# Patient Record
Sex: FEMALE | Race: WHITE | NOT HISPANIC OR LATINO | Employment: FULL TIME | URBAN - METROPOLITAN AREA
[De-identification: names, ages, dates, MRNs, and addresses within clinical notes are randomized per-mention and may not be internally consistent; named-entity substitution may affect disease eponyms.]

---

## 2023-03-25 ENCOUNTER — OFFICE VISIT (OUTPATIENT)
Dept: URGENT CARE | Facility: CLINIC | Age: 33
End: 2023-03-25

## 2023-03-25 VITALS
TEMPERATURE: 99.6 F | HEART RATE: 96 BPM | HEIGHT: 65 IN | RESPIRATION RATE: 20 BRPM | OXYGEN SATURATION: 100 % | DIASTOLIC BLOOD PRESSURE: 80 MMHG | SYSTOLIC BLOOD PRESSURE: 114 MMHG | BODY MASS INDEX: 21.8 KG/M2

## 2023-03-25 DIAGNOSIS — J06.9 UPPER RESPIRATORY TRACT INFECTION, UNSPECIFIED TYPE: Primary | ICD-10-CM

## 2023-03-25 RX ORDER — ALBUTEROL SULFATE 90 UG/1
2 AEROSOL, METERED RESPIRATORY (INHALATION) EVERY 4 HOURS PRN
Qty: 6.7 G | Refills: 0 | Status: SHIPPED | OUTPATIENT
Start: 2023-03-25

## 2023-03-25 RX ORDER — PREDNISONE 20 MG/1
20 TABLET ORAL DAILY
Qty: 4 TABLET | Refills: 0 | Status: SHIPPED | OUTPATIENT
Start: 2023-03-25 | End: 2023-03-29

## 2023-03-25 NOTE — PROGRESS NOTES
[facility] [facility]        NAME: Ros Espinosa is a 33 y o  female  : 1990    MRN: [id number]  DATE: 2023  TIME: 8:57 AM    Assessment and Plan   Upper respiratory tract infection, unspecified type [J06 9]  1  Upper respiratory tract infection, unspecified type  predniSONE 20 mg tablet    albuterol (Proventil HFA) 90 mcg/act inhaler        Pt clinically appears well  Discussed strict return to care precautions as well as red flag symptoms which should prompt immediate ED referral  Pt verbalized understanding and is in agreement with plan  Please follow up with your primary care provider within the next week  Please remember that your visit today was with an urgent care provider and should not replace follow up with your primary care provider for chronic medical issues or annual physicals  Patient Instructions       Follow up with PCP in 3-5 days  Proceed to  ER if symptoms worsen  Chief Complaint     Chief Complaint   Patient presents with   • Cold Like Symptoms     Pt here ill x 5 days  s/s  sore throat, cough,  ear  pressure, congestion  No fever  Pt used OTC meds not helping  Pt is vaxed,  no Flu shot  No home  Covid  test  done  History of Present Illness       Ros Espinosa is a(n) 33 y o  female presenting with URI symptoms x 5 days  Past medical history: HOWIE  Congestion: yes  Sore throat: yes, worse in morning  Cough: yes  Sputum production: yes, green/brown  States she saw a few specks of blood in sputum  Fever: no  Body aches: yes  Loss of smell/taste: no  GI symptoms: no   Known sick contacts: some coworkers  OTC meds tried: Tessalon pearls, cough drops, Theraflu   Vaccinated against COVID19: yes    No facial pressure, HA, CP, SOB  +rib pain from coughing  Used a nebulizer at work yesterday which provided some relief but she does not have one at home  Has been waking up in the middle of the night from coughing        Review of Systems   Review of Systems Constitutional: Negative for fever  Negative except as noted in HPI   Respiratory: Negative for chest tightness, shortness of breath and wheezing  Cardiovascular: Negative for chest pain  Current Medications       Current Outpatient Medications:   •  albuterol (Proventil HFA) 90 mcg/act inhaler, Inhale 2 puffs every 4 (four) hours as needed for wheezing or shortness of breath (persistent cough), Disp: 6 7 g, Rfl: 0  •  ALPRAZolam (XANAX) 0 5 mg tablet, Take 1 tablet (0 5 mg total) by mouth 2 (two) times a day as needed for anxiety, Disp: 30 tablet, Rfl: 0  •  amphetamine-dextroamphetamine (ADDERALL) 10 mg tablet, Take 1 tablet (10 mg total) by mouth daily Max Daily Amount: 10 mg, Disp: 30 tablet, Rfl: 0  •  benzonatate (TESSALON) 200 MG capsule, Take 1 capsule (200 mg total) by mouth 3 (three) times a day as needed for cough, Disp: 20 capsule, Rfl: 0  •  predniSONE 20 mg tablet, Take 1 tablet (20 mg total) by mouth daily for 4 days, Disp: 4 tablet, Rfl: 0  •  VENTOLIN  (90 Base) MCG/ACT inhaler, , Disp: , Rfl:     Current Allergies     Allergies as of 03/25/2023   • (No Known Allergies)            The following portions of the patient's history were reviewed and updated as appropriate: allergies, current medications, past family history, past medical history, past social history, past surgical history and problem list      Past Medical History:   Diagnosis Date   • Anxiety        Past Surgical History:   Procedure Laterality Date   • TONSILLECTOMY         Family History   Problem Relation Age of Onset   • Hypertension Mother    • Heart disease Father    • Diabetes Father    • Atrial fibrillation Father    • Sleep apnea Father    • Substance Abuse Neg Hx    • Mental illness Neg Hx          Medications have been verified          Objective   /80   Pulse 96   Temp 99 6 °F (37 6 °C) (Tympanic)   Resp 20   Ht 5' 5" (1 651 m)   SpO2 100%   BMI 21 80 kg/m²        Physical Exam Physical Exam  Vitals and nursing note reviewed  Constitutional:       General: She is not in acute distress  Appearance: Normal appearance  She is not toxic-appearing  HENT:      Head: Normocephalic and atraumatic  Right Ear: Tympanic membrane, ear canal and external ear normal       Left Ear: Tympanic membrane, ear canal and external ear normal       Nose: No congestion  Mouth/Throat:      Mouth: Mucous membranes are moist       Pharynx: Oropharynx is clear  No oropharyngeal exudate or posterior oropharyngeal erythema  Eyes:      Conjunctiva/sclera: Conjunctivae normal       Pupils: Pupils are equal, round, and reactive to light  Cardiovascular:      Rate and Rhythm: Normal rate and regular rhythm  Heart sounds: Normal heart sounds  Pulmonary:      Effort: Pulmonary effort is normal  No respiratory distress  Breath sounds: Normal breath sounds  No wheezing, rhonchi or rales  Abdominal:      General: Abdomen is flat  Palpations: Abdomen is soft  Musculoskeletal:      Cervical back: Normal range of motion and neck supple  Skin:     General: Skin is warm and dry  Capillary Refill: Capillary refill takes less than 2 seconds  Neurological:      Mental Status: She is alert and oriented to person, place, and time     Psychiatric:         Behavior: Behavior normal

## 2023-06-08 ENCOUNTER — OFFICE VISIT (OUTPATIENT)
Dept: FAMILY MEDICINE CLINIC | Facility: CLINIC | Age: 33
End: 2023-06-08
Payer: COMMERCIAL

## 2023-06-08 ENCOUNTER — TELEPHONE (OUTPATIENT)
Dept: FAMILY MEDICINE CLINIC | Facility: CLINIC | Age: 33
End: 2023-06-08

## 2023-06-08 ENCOUNTER — TELEPHONE (OUTPATIENT)
Dept: OTHER | Facility: OTHER | Age: 33
End: 2023-06-08

## 2023-06-08 VITALS
RESPIRATION RATE: 18 BRPM | HEIGHT: 65 IN | TEMPERATURE: 99.7 F | SYSTOLIC BLOOD PRESSURE: 122 MMHG | OXYGEN SATURATION: 99 % | WEIGHT: 131 LBS | HEART RATE: 74 BPM | BODY MASS INDEX: 21.83 KG/M2 | DIASTOLIC BLOOD PRESSURE: 74 MMHG

## 2023-06-08 DIAGNOSIS — S39.012A STRAIN OF LUMBAR REGION, INITIAL ENCOUNTER: Primary | ICD-10-CM

## 2023-06-08 PROCEDURE — 99213 OFFICE O/P EST LOW 20 MIN: CPT | Performed by: FAMILY MEDICINE

## 2023-06-08 NOTE — TELEPHONE ENCOUNTER
Patient is requesting an appointment to be seen as soon as possible and needs a note for work due to injuring her back    Please call

## 2023-06-08 NOTE — LETTER
June 8, 2023     Patient: Ramírez Palacios  YOB: 1990  Date of Visit: 6/8/2023      To Whom it May Concern:    Shaila Rivera is under my professional care  Anisa Bruno was seen in my office on 6/8/2023  Anisa Bruno may return to work on 6/10/23   If you have any questions or concerns, please don't hesitate to call           Sincerely,          Zain Contreras MD        CC: No Recipients

## 2023-06-08 NOTE — PATIENT INSTRUCTIONS
Continue ibuprofen 600 mg 3 times daily  I would ice the area 3-4 times daily for the next 3 to 4 days  Then switch to heat  We will give work note for 1 day  Gentle stretching  If this does not resolve through the weekend, consider physical therapy

## 2023-06-08 NOTE — PROGRESS NOTES
[address]         NAME: Dharmesh Pascal is a 29 y o  female  : 1990    MRN: [id number]  DATE: 2023  TIME: 4:41 PM    Assessment and Plan   Strain of lumbar region, initial encounter [S39 012A]  1  Strain of lumbar region, initial encounter            No problem-specific Assessment & Plan notes found for this encounter  Patient Instructions     Patient Instructions   Continue ibuprofen 600 mg 3 times daily  I would ice the area 3-4 times daily for the next 3 to 4 days  Then switch to heat  We will give work note for 1 day  Gentle stretching  If this does not resolve through the weekend, consider physical therapy  Chief Complaint     Chief Complaint   Patient presents with   • Back Pain     Pulled something in her back after twisting from getting attacked from another dog  History of Present Illness       Comes in today for evaluation of back pain  Had an injury yesterday while lifting her dog away from another animal   Twisted and has had some pain on the lumbar region  Remote history of previous back injury 6 to 7 years ago  Somewhat better today after using ice and ibuprofen  Review of Systems   Review of Systems   Constitutional: Positive for activity change  Negative for fatigue and fever  Musculoskeletal: Positive for back pain, myalgias and neck pain  Negative for neck stiffness  Skin: Negative for rash and wound  Neurological: Negative for weakness           Current Medications       Current Outpatient Medications:   •  albuterol (Proventil HFA) 90 mcg/act inhaler, Inhale 2 puffs every 4 (four) hours as needed for wheezing or shortness of breath (persistent cough), Disp: 6 7 g, Rfl: 0  •  ALPRAZolam (XANAX) 0 5 mg tablet, Take 1 tablet (0 5 mg total) by mouth 2 (two) times a day as needed for anxiety, Disp: 30 tablet, Rfl: 0  •  amphetamine-dextroamphetamine (ADDERALL) 10 mg tablet, Take 1 tablet (10 mg total) by mouth Pt reports elevated BP x couple days. Pt states she took an old prescription of micardis yesterday to see if it would help, some relief. Also taking clonidine 0.2mg today, PTA (normal dose is clonidine 0.1mg)per cardiologist.  Reports speaking with on-call Cardiologist and advised to go to the ED if BP did not come down. "daily Max Daily Amount: 10 mg, Disp: 30 tablet, Rfl: 0  •  VENTOLIN  (90 Base) MCG/ACT inhaler, , Disp: , Rfl:   •  benzonatate (TESSALON) 200 MG capsule, Take 1 capsule (200 mg total) by mouth 3 (three) times a day as needed for cough (Patient not taking: Reported on 6/8/2023), Disp: 20 capsule, Rfl: 0    Current Allergies     Allergies as of 06/08/2023   • (No Known Allergies)            The following portions of the patient's history were reviewed and updated as appropriate: allergies, current medications, past family history, past medical history, past social history, past surgical history and problem list      Past Medical History:   Diagnosis Date   • Anxiety        Past Surgical History:   Procedure Laterality Date   • TONSILLECTOMY         Family History   Problem Relation Age of Onset   • Hypertension Mother    • Heart disease Father    • Diabetes Father    • Atrial fibrillation Father    • Sleep apnea Father    • Substance Abuse Neg Hx    • Mental illness Neg Hx          Medications have been verified  Objective   /74 (BP Location: Right arm, Patient Position: Sitting, Cuff Size: Adult)   Pulse 74   Temp 99 7 °F (37 6 °C) (Tympanic)   Resp 18   Ht 5' 5\" (1 651 m)   Wt 59 4 kg (131 lb)   LMP 05/23/2023   SpO2 99%   BMI 21 80 kg/m²        Physical Exam     Physical Exam  Constitutional:       Appearance: Normal appearance  She is not ill-appearing  HENT:      Head: Normocephalic  Nose: Nose normal    Cardiovascular:      Rate and Rhythm: Normal rate and regular rhythm  Heart sounds: Normal heart sounds  Pulmonary:      Effort: Pulmonary effort is normal       Breath sounds: Normal breath sounds  Musculoskeletal:      Lumbar back: Spasms and tenderness present  Decreased range of motion  Negative right straight leg raise test and negative left straight leg raise test       Right hip: No bony tenderness or crepitus  Normal range of motion        Left hip: No bony " tenderness or crepitus  Normal range of motion  Skin:     Findings: No bruising or erythema  Neurological:      Mental Status: She is alert

## 2023-06-14 ENCOUNTER — TELEPHONE (OUTPATIENT)
Dept: PSYCHIATRY | Facility: CLINIC | Age: 33
End: 2023-06-14

## 2023-06-14 NOTE — TELEPHONE ENCOUNTER
"Behavioral Health Outpatient Intake Questions    Referred By   : self    Please advise interviewee that they need to answer all questions truthfully to allow for best care, and any misrepresentations of information may affect their ability to be seen at this clinic   => Was this discussed? Yes     If Minor Child (under age 25)    Who is/are the legal guardian(s) of the child? Is there a custody agreement? No     • If \"YES\"- Custody orders must be obtained prior to scheduling the first appointment  • In addition, Consent to Treatment must be signed by all legal guardians prior to scheduling the first appointment    • If \"NO\"- Consent to Treatment must be signed by all legal guardians prior to scheduling the first appointment    Behavioral Health Outpatient Intake History -     Presenting Problem (in patient's own words):  Child of alcoholics, anxiety, unrealistic expectations of self and others, newly     Are there any communication barriers for this patient? Yes                                               If yes, please describe barriers: ADD, audio processing delay  • If there is a unique situation, please refer to [facility] for final determination  Are you taking any psychiatric medications? No   •   If \"YES\" -What are they none   •   If \"YES\" -Who prescribes? Has the Patient previously received outpatient Talk Therapy or Medication Management from [place] [id number]  No     •    If \"YES\"- When, Where and with Whom? •    If \"NO\" -Has Patient received these services elsewhere? •   If \"YES\" -When, Where, and with Whom? [facility] spouse services    Has the Patient abused alcohol or other substances in the last 6 months ? No  No concerns of substance abuse are reported  •  If \"YES\" -What substance, How much, How often? •  If illegal substance: Refer to [employer] (for JOSE) or [employer]    •  If Alcohol in excess of 10 drinks per week:  " "Refer to [facility] (for JOSE) or [facility]    Legal History-     Is this treatment court ordered? No   If \"yes \"send to :  • Talk Therapy : [facility] to [facility] for final determination   • Med Management: Send to Dr Raudel Hernandez for final determination     Has the Patient been convicted of a felony? No   If \"Yes\" send to -When, What? • Talk Therapy : Send to [facility] for final determination   • Med Management: Send to Dr Raudel Hernandez for final determination     ACCEPTED as a patient Yes  • If \"Yes\" Appointment Date: 7/26/2023 @ 1:00 PM with Ellie Bautista    Referred Elsewhere? No  • If “Yes” - (Where? Ex: Xochitl Mccullough, [facility], [address] [place] [address], etc )       Name of Insurance Co: [address] [place] [address] ID# [id number]  [id number] Phone #   If ins is primary or secondary? Primary  If patient is a minor, parents information such as Name, D  O B of guarantor    "

## 2023-06-23 DIAGNOSIS — F41.9 ANXIETY: ICD-10-CM

## 2023-06-26 RX ORDER — ALPRAZOLAM 0.5 MG/1
0.5 TABLET ORAL 2 TIMES DAILY PRN
Qty: 30 TABLET | Refills: 0 | Status: SHIPPED | OUTPATIENT
Start: 2023-06-26

## 2023-07-26 ENCOUNTER — TELEMEDICINE (OUTPATIENT)
Dept: FAMILY MEDICINE CLINIC | Facility: CLINIC | Age: 33
End: 2023-07-26
Payer: COMMERCIAL

## 2023-07-26 DIAGNOSIS — H00.015 HORDEOLUM EXTERNUM OF LEFT LOWER EYELID: Primary | ICD-10-CM

## 2023-07-26 DIAGNOSIS — H10.32 ACUTE BACTERIAL CONJUNCTIVITIS OF LEFT EYE: ICD-10-CM

## 2023-07-26 DIAGNOSIS — Z13.6 SCREENING FOR CARDIOVASCULAR CONDITION: ICD-10-CM

## 2023-07-26 PROCEDURE — 99213 OFFICE O/P EST LOW 20 MIN: CPT | Performed by: FAMILY MEDICINE

## 2023-07-26 RX ORDER — ERYTHROMYCIN 5 MG/G
0.5 OINTMENT OPHTHALMIC EVERY 6 HOURS SCHEDULED
Qty: 3.5 G | Refills: 0 | Status: SHIPPED | OUTPATIENT
Start: 2023-07-26

## 2023-07-26 NOTE — PATIENT INSTRUCTIONS
Use warm compresses to the eye 3-4 times daily. Rhythm Meissen eye ointment 3-4 times daily. If this does not clear up within the next 5 to 7 days, seek ophthalmologic consultation. New order placed for routine labs to include CMP and lipid panel.

## 2023-07-26 NOTE — PROGRESS NOTES
Virtual Regular Visit    Verification of patient location:    Patient is located at Home in the following state in which I hold an active license [place]      Assessment/Plan:    Problem List Items Addressed This Visit    None  Visit Diagnoses     Hordeolum externum of left lower eyelid    -  Primary    Relevant Medications    erythromycin (ILOTYCIN) ophthalmic ointment    Other Relevant Orders    Ambulatory Referral to Ophthalmology    Acute bacterial conjunctivitis of left eye        Relevant Medications    erythromycin (ILOTYCIN) ophthalmic ointment    Other Relevant Orders    Ambulatory Referral to Ophthalmology    Screening for cardiovascular condition        Relevant Orders    Comprehensive metabolic panel    Lipid Panel with Direct LDL reflex               Reason for visit is   Chief Complaint   Patient presents with   • Conjunctivitis     runny   • Virtual Regular Visit        Encounter provider Donte Pineda MD    Provider located at [address] [place] [address] [address] [facility]      Recent Visits  No visits were found meeting these conditions. Showing recent visits within past 7 days and meeting all other requirements  Today's Visits  Date Type Provider Dept   07/26/23 Telemedicine Donte Pineda MD [facility] today's visits and meeting all other requirements  Future Appointments  No visits were found meeting these conditions. Showing future appointments within next 150 days and meeting all other requirements       The patient was identified by name and date of birth. Lilly York was informed that this is a telemedicine visit and that the visit is being conducted through the [employer]. She agrees to proceed. .  My office door was closed. No one else was in the room. She acknowledged consent and understanding of privacy and security of the video platform.  The patient has agreed to participate and understands they can discontinue the visit at any time. Patient is aware this is a billable service. Subjective  Sreedhar Gomez is a 29 y.o. female 8 . Patient calls in today had a spot on her left lower eyelid over the last several weeks. Has been hoping would just go away. She had manipulated the area yesterday and now has more tasting shot and some discharge from the left eye. Past Medical History:   Diagnosis Date   • Anxiety        Past Surgical History:   Procedure Laterality Date   • TONSILLECTOMY         Current Outpatient Medications   Medication Sig Dispense Refill   • albuterol (Proventil HFA) 90 mcg/act inhaler Inhale 2 puffs every 4 (four) hours as needed for wheezing or shortness of breath (persistent cough) 6.7 g 0   • ALPRAZolam (XANAX) 0.5 mg tablet Take 1 tablet (0.5 mg total) by mouth 2 (two) times a day as needed for anxiety 30 tablet 0   • amphetamine-dextroamphetamine (ADDERALL) 10 mg tablet Take 1 tablet (10 mg total) by mouth daily Max Daily Amount: 10 mg 30 tablet 0   • erythromycin (ILOTYCIN) ophthalmic ointment Administer 0.5 inches into the left eye every 6 (six) hours 3.5 g 0   • VENTOLIN  (90 Base) MCG/ACT inhaler      • benzonatate (TESSALON) 200 MG capsule Take 1 capsule (200 mg total) by mouth 3 (three) times a day as needed for cough (Patient not taking: Reported on 6/8/2023) 20 capsule 0     No current facility-administered medications for this visit. No Known Allergies     Review of Systems   Eyes: Positive for discharge and redness. Negative for pain and visual disturbance. Video Exam    There were no vitals filed for this visit. Physical Exam  Eyes:      General:         Left eye: Hordeolum present. Comments: Left lower eyelid lateral aspect. Blocked gland versus stye. Mild injection of the conjunctiva.           Visit Time  Total Visit Duration: 10

## 2023-08-10 ENCOUNTER — SOCIAL WORK (OUTPATIENT)
Dept: BEHAVIORAL/MENTAL HEALTH CLINIC | Facility: CLINIC | Age: 33
End: 2023-08-10
Payer: COMMERCIAL

## 2023-08-10 DIAGNOSIS — F43.21 ADJUSTMENT DISORDER WITH DEPRESSED MOOD: ICD-10-CM

## 2023-08-10 DIAGNOSIS — F41.1 GENERALIZED ANXIETY DISORDER: Primary | ICD-10-CM

## 2023-08-10 PROCEDURE — 90837 PSYTX W PT 60 MINUTES: CPT | Performed by: SOCIAL WORKER

## 2023-08-10 NOTE — PSYCH
Behavioral Health Psychotherapy Assessment    Date of Initial Psychotherapy Assessment: 08/10/23  Referral Source: use to work for PF  Has a release of information been signed for the referral source? Yes    Preferred Name: Lili Black  Preferred Pronouns: She/her  YOB: 1990 Age: 29 y.o. Sex assigned at birth: female   Gender Identity: female  Race:   Preferred Language: English    Emergency Contact:  Full Name: Mariela Mccartney  Relationship to Client:    Contact information: [phone]    Primary Care Physician:  Sindy Lewis MD  [address] [place] [address] 2  [place] [phone]  Has a release of information been signed? Yes    Physical Health History:  Past surgical procedures: wisdom teeth  Do you have a history of any of the following: other N/A  Do you have any mobility issues? No    Relevant Family History:  Pt dad develop diabetes since COVID. Pt dad has afib, sleep apnea,  Alcoholism runs in family. Presenting Problem (What brings you in?)  Anxiety, needing CBT to restructure negative thinking, surviving being a child of alcoholism, enabling on  side of family with alcoholism, need balance, black and white thinking. Mental Health Advance Directive:  Do you currently have a Mental Health Advance Directive? no    Diagnosis:   Diagnosis ICD-10-CM Associated Orders   1. Generalized anxiety disorder  F41.1       2.  Adjustment disorder with depressed mood  F43.21           Initial Assessment:     Current Mental Status:    Appearance: appropriate and neat      Behavior/Manner: cooperative      Speech:  Normal and articulate    Sleep:  Normal    Oriented to: oriented to self, oriented to place and oriented to time       Clinical Symptoms    Depression: yes      Anxiety: yes      Depression Symptoms: depressed mood, restlessness, fatigue, indecision, poor concentration and irritable      Anxiety Symptoms: excessive worry, irritable, feeling of choking, nervous/anxious, difficulty controlling worry, restlessness, chest tightness, shortness of breath and dizziness      Have you ever been assaultive to others or the environment: No      Have you ever been self-injurious: No      Counseling History:  Previous Counseling or Treatment  (Mental Health or Drug & Alcohol): Yes    [facility]. Have you previously taken psychiatric medications: No      Suicide Risk Assessment  Have you ever had a suicide attempt: No    Have you had incidents of suicidal ideation: No    Are you currently experiencing suicidal thoughts: No      Substance Abuse/Addiction Assessment:  Alcohol: No    Heroin: No    Fentanyl: No    Opiates: No    Cocaine: No    Amphetamines: No    Hallucinogens: No    Club Drugs: No    Benzodiazepines: No    Other Rx Meds: No    Marijuana: No    Tobacco/Nicotine: No    Have you experienced blackouts as a result of substance use: No    Have you had any periods of abstinence: No    Have you experienced symptoms of withdrawal: No    Have you ever overdosed on any substances?: No    Are you currently using any Medication Assisted Treatment for Substance Use: No      Disordered Eating History:  Do you have a history of disordered eating: No      Social Determinants of Health:    SDOH:  Stress and social isolation    Trauma and Abuse History:    Have you ever been abused: No      Legal History:    Have you ever been arrested or had a DUI: Yes      Have you been incarcerated: Yes      Are you currently on parole/probation: No      Any current Children and Youth involvement: No      Any pending legal charges: No      Additional Legal History:  Early 20's had a DUI, don't have a record, got dropped. Never drank again. Relationship History:    Current marital status:       Natural Supports:   Mother, siblings and friends    Relationship History:  Pt reports that her mother is supportive, her dad is alcoholic and homeless, she doesn't hear from him as much. Pt reports that she has a half-sister and few friends that are supportive.     Employment History    Are you currently employed: Yes      Longest period of employment:  1.5 years ago    Employer/ Job title:  [employer]    Future work goals:  Applied to [employer] starting next January    Sources of income/financial support:  Work     History:      Status: no history of  [place] duty  Educational History:     Have you ever been diagnosed with a learning disability: Yes      Learning disability:  Audio processing delay    Highest level of education:  [address] attended/attendin[address] [place], [place] [address]d Through Hank Mel[employer]dez    Have you ever had an IEP or 504-plan: Yes      IEP/504 plan:  IEP did really well in school    Do you need assistance with reading or writing: No      Recommended Treatment:     Frequency:  2 times    Session frequency:  Monthly      Visit start and stop times:    08/10/23  Start Time: 900  Stop Time: 956  Total Visit Time: 56 minutes

## 2023-08-10 NOTE — BH CRISIS PLAN
Client Name: Shanna Ward       Client YOB: 1990  : 1990    Treatment Team (include name and contact information):     Psychotherapist: PERYR    Psychiatrist:  PERRY   Release of information completed: no     NA     Release of information completed: no    Other (Specify Role): N/A    Release of information completed: no    Other (Specify Role): N/A   Release of information completed: no    Healthcare Provider  Sharita Greenfield MD  [address] [facility] [address]  [place] [address]  [phone]    Type of Plan   * Child plans (children 15 yo and younger) must be completed and signed by the child's legal guardian   * Plans for all individuals 15 yo and above must be signed by the client. Plan Type: adolescent/adult (14 and over) Initial      My Personal Strengths are (in the client's own words):  Organized, dedicated, disciplined with finances. The stressors and triggers that may put me at risk are:  family conflict, alcoholism in the family, enabling. Coping skills I can use to keep myself calm and safe:  [place]/meditate, got a sponsor    Coping skills/supports I can use to maintain abstinence from substance use:   JIM    The people that provide me with help and support: (Include name, contact, and how they can help)   Support person #1:  Esteban Carter,     * Phone number: [phone]    * How can they help me? He is calm and will listen, ask her what she needs. Support person #2: Dm Morel    * Phone number: NA    * How can they help me? Talk to her everyday, help her come up with goals. Support person #3:  Nevelyn Landau, best friend    * Phone number: [phone]    * How can they help me? She will be there when I need her.     In the past, the following has helped me in times of crisis:    Being in a quiet space, Calling my sponsor and Taking a walk or exercising      If it is an emergency and you need immediate help, call     If there is a possibility of danger to yourself or others, call the following crisis hotline resources:     Adult Crisis Numbers  Suicide Prevention Hotline - Dial   [facility]: [address]: [address]: [address]: [phone]  [facility]: [phone]  [facility]: [address]: [address]: [phone] (daytime). [phone] (after hours, weekends, holidays)     Child/Adolescent Crisis Numbers   [facility]: [address]: [phone]   [facility]: [phone]   [facility]: [phone]    Please note: Some [facility] do not have a separate number for Child/Adolescent specific crisis. If your county is not listed under Child/Adolescent, please call the adult number for your county     National Talk to Text Line   All Ages - [phone]    In the event your feelings become unmanageable, and you cannot reach your support system, you will call 911 immediately or go to the nearest hospital emergency room.

## 2023-08-24 ENCOUNTER — SOCIAL WORK (OUTPATIENT)
Dept: BEHAVIORAL/MENTAL HEALTH CLINIC | Facility: CLINIC | Age: 33
End: 2023-08-24
Payer: COMMERCIAL

## 2023-08-24 DIAGNOSIS — F41.1 GENERALIZED ANXIETY DISORDER: Primary | ICD-10-CM

## 2023-08-24 PROCEDURE — 90837 PSYTX W PT 60 MINUTES: CPT | Performed by: SOCIAL WORKER

## 2023-08-24 NOTE — PSYCH
Behavioral Health Psychotherapy Progress Note    Psychotherapy Provided: Individual Psychotherapy     1. Generalized anxiety disorder            Goals addressed in session: Goal 1     DATA: Pt reports that she struggles with communication issues with her in laws, her mother in law, talks behind her back and her father in law stays busy to keep out of any conflict. Pt reports that she doesn't feel loved or appreciated because she is not pregnant or have any children with her spouse. Pt reports that her spouse feels stuck between her and his mother and it is hard for him to deal with conflict amongst his family members. During this session, this clinician used the following therapeutic modalities: Cognitive Behavioral Therapy    Substance Abuse was not addressed during this session. If the client is diagnosed with a co-occurring substance use disorder, please indicate any changes in the frequency or amount of use: none. Stage of change for addressing substance use diagnoses: No substance use/Not applicable    ASSESSMENT:  Judson Lama presents with a Euthymic/ normal mood. her affect is Normal range and intensity, which is congruent, with her mood and the content of the session. The client has made progress on their goals. Judson Lama presents with a none risk of suicide, none risk of self-harm, and none risk of harm to others. For any risk assessment that surpasses a "low" rating, a safety plan must be developed. A safety plan was indicated: no  If yes, describe in detail     PLAN: Between sessions, Judson Lama will work on discussing her emotions with her family especially mother-in-law, not allow emotions to boil over, articulate her needs to others. At the next session, the therapist will use Solution-Focused Therapy to address feelings of rejection. Therapist and pt will role-play on how to deal with conflict without internalizing it.     Behavioral Health Treatment Plan and Discharge Planning: Judson Lama is aware of and agrees to continue to work on their treatment plan. They have identified and are working toward their discharge goals.  yes    Visit start and stop times:    08/24/23  Start Time: 0900  Stop Time: 0957  Total Visit Time: 57 minutes

## 2023-08-24 NOTE — BH TREATMENT PLAN
[address] [facility]  1990     Date of Initial Psychotherapy Assessment: 8-  Date of Current Treatment Plan: 08/24/23  Treatment Plan Target Date: 02-  Treatment Plan Expiration Date: 02-    Diagnosis:   1. Generalized anxiety disorder            Area(s) of Need: Pt reports that she needs to work anxiety and self-esteem issues, learning to cope being around 's family for 30 minutes or longer without feeling overwhelmed. Long Term Goal 1 (in the client's own words): Pt reports that she would like to be able to have peace no matter who she is around, not allowing people to affect her day, life or time. Stage of Change: Contemplation    Target Date for completion: 02-     Anticipated therapeutic modalities: Pt will take a break when she is feeling agitated, take dog for a walk, call her sponsor for [place], breathing exercising, practice box breathing to avoid feeling on edge. Pt will focus on self-care, learn breathing techniques that will aid her in staying calm. People identified to complete this goal: Pt       Objective 1: (identify the means of measuring success in meeting the objective): Pt will learn to manage an array of emotions to deal with day to day stressors ongoing. Interventions: Therapist will role-play with pt on how to deal with conflict and manage stress with family. Therapist will help pt to identify coping mechanisms to elevate stress from family conflict. Therapist will educate pt on the benefits of articulating her needs, setting boundaries with loved ones so that she is not feeling overwhelmed. Therapist will assign pt homework assignments to work on self-esteem issues and encourage her to decompress daily.      I am currently under the care of a [facility] psychiatric provider: no    My [facility] psychiatric provider is: N/A    I am currently taking psychiatric medications: No    I feel that I will be ready for discharge from mental health care when I reach the following (measurable goal/objective):  Pt reports that she "would like to re-evaluate in one year, therapy is ongoing not fixed in 5 sessions."    For children and adults who have a legal guardian:   Has there been any change to custody orders and/or guardianship status? NA. If yes, attach updated documentation. I have created my Crisis Plan and have been offered a copy of this plan    [address]: Diagnosis and Treatment Plan explained to [address] [place] [address] acknowledges an understanding of their diagnosis. Kiara Momin agrees to this treatment plan.     I have been offered a copy of this Treatment Plan. yes

## 2023-09-05 ENCOUNTER — TELEMEDICINE (OUTPATIENT)
Dept: BEHAVIORAL/MENTAL HEALTH CLINIC | Facility: CLINIC | Age: 33
End: 2023-09-05
Payer: COMMERCIAL

## 2023-09-05 DIAGNOSIS — F41.1 GENERALIZED ANXIETY DISORDER: Primary | ICD-10-CM

## 2023-09-05 PROCEDURE — 90834 PSYTX W PT 45 MINUTES: CPT | Performed by: SOCIAL WORKER

## 2023-09-05 NOTE — PSYCH
Behavioral Health Psychotherapy Progress Note    Psychotherapy Provided: Individual Psychotherapy     1. Generalized anxiety disorder            Goals addressed in session: Goal 1     DATA: Pt reports that she will be working 12 hour shifts coming up for 3-4 days at a time. Pt reports that she doesn't hear from her mother-in-law, don't feel like she can have a positive relationship with her 's mother. Pt reports that her mother-in-law doesn't seem to recognize when pt is being triggered, feel unappreciated, feel resentful, have a hard time of letting things go if she doesn't confront her. Pt reports that she has been an anxious little kid, she thinks because she was a child of 2 alcoholics, feel horrible most of the times, feeling drained, like empty, nothing left to give from being anxious for so long, it has a negative impact on her health. Pt reports that she needs to work on being kind, respect her loved ones, that she needs to love people as Jodee Vaughn suggested as a Sabianism. During this session, this clinician used the following therapeutic modalities: Client-centered Therapy    Substance Abuse was not addressed during this session. If the client is diagnosed with a co-occurring substance use disorder, please indicate any changes in the frequency or amount of use: none. Stage of change for addressing substance use diagnoses: No substance use/Not applicable    ASSESSMENT:  Yamila Carter presents with a Anxious mood. her affect is Normal range and intensity, which is congruent, with her mood and the content of the session. The client has made progress on their goals. Yamila Carter presents with a none risk of suicide, none risk of self-harm, and none risk of harm to others. For any risk assessment that surpasses a "low" rating, a safety plan must be developed.     A safety plan was indicated: no  If yes, describe in detail   PLAN: Between sessions, Yamila Carter will working on juggling multiple things in the house, cooking, meal prep, go to school, feel overwhelmed. At the next session, the therapist will use Cognitive Behavioral Therapy to address pt's struggle with having refusal skills, will also help pt find scripture to manage her anxiety per her request developing a stronger relationship with God. Behavioral Health Treatment Plan and Discharge Planning: Dennis Ware is aware of and agrees to continue to work on their treatment plan. They have identified and are working toward their discharge goals. yes    Visit start and stop times:    09/05/23  Start Time: 0804  Stop Time: 4770  Total Visit Time: 45 minutes    Virtual Regular Visit    Verification of patient location:    Patient is located at Home in the following state in which I hold an active license [place]      Assessment/Plan:    Problem List Items Addressed This Visit    None  Visit Diagnoses     Generalized anxiety disorder    -  Primary          Goals addressed in session: Goal 1          Reason for visit is   Chief Complaint   Patient presents with   • Virtual Regular Visit        Encounter provider JEREMIAH Godoy    Provider located at [address] [place], [place] [phone]      Recent Visits  No visits were found meeting these conditions. Showing recent visits within past 7 days and meeting all other requirements  Today's Visits  Date Type Provider Dept   09/05/23 Telemedicine Andrea Miles[address] Therapist [facility]   Showing today's visits and meeting all other requirements  Future Appointments  No visits were found meeting these conditions. Showing future appointments within next 150 days and meeting all other requirements       The patient was identified by name and date of birth.  Dennis Ware was informed that this is a telemedicine visit and that the visit is being conducted throughthe [employer] platform. She agrees to proceed. .  My office door was closed. No one else was in the room. She acknowledged consent and understanding of privacy and security of the video platform. The patient has agreed to participate and understands they can discontinue the visit at any time. Patient is aware this is a billable service. Jeremy Breaux is a 29 y.o. female        HPI     Past Medical History:   Diagnosis Date   • Anxiety        Past Surgical History:   Procedure Laterality Date   • TONSILLECTOMY         Current Outpatient Medications   Medication Sig Dispense Refill   • albuterol (Proventil HFA) 90 mcg/act inhaler Inhale 2 puffs every 4 (four) hours as needed for wheezing or shortness of breath (persistent cough) 6.7 g 0   • ALPRAZolam (XANAX) 0.5 mg tablet Take 1 tablet (0.5 mg total) by mouth 2 (two) times a day as needed for anxiety 30 tablet 0   • amphetamine-dextroamphetamine (ADDERALL) 10 mg tablet Take 1 tablet (10 mg total) by mouth daily Max Daily Amount: 10 mg 30 tablet 0   • benzonatate (TESSALON) 200 MG capsule Take 1 capsule (200 mg total) by mouth 3 (three) times a day as needed for cough (Patient not taking: Reported on 6/8/2023) 20 capsule 0   • erythromycin (ILOTYCIN) ophthalmic ointment Administer 0.5 inches into the left eye every 6 (six) hours 3.5 g 0   • VENTOLIN  (90 Base) MCG/ACT inhaler        No current facility-administered medications for this visit. No Known Allergies    Review of Systems    Video Exam    There were no vitals filed for this visit.     Physical Exam     Visit Time

## 2023-09-22 ENCOUNTER — TELEMEDICINE (OUTPATIENT)
Dept: BEHAVIORAL/MENTAL HEALTH CLINIC | Facility: CLINIC | Age: 33
End: 2023-09-22
Payer: COMMERCIAL

## 2023-09-22 DIAGNOSIS — F41.1 GENERALIZED ANXIETY DISORDER: Primary | ICD-10-CM

## 2023-09-22 PROCEDURE — 90834 PSYTX W PT 45 MINUTES: CPT | Performed by: SOCIAL WORKER

## 2023-09-22 NOTE — PSYCH
Behavioral Health Psychotherapy Progress Note    Psychotherapy Provided: Individual Psychotherapy     1. Generalized anxiety disorder            Goals addressed in session: Goal 1     DATA: Pt reports that her schedule sucks, she has a lot to do, been feeling little down, looking at things temporarily, work 12 hour shifts, feels overwhelmed. Pt reports that she doesn't have control over her schedule, miss out on activities with family. Pt reports that her mother in law says lets get together but she doesn't really mean it. Pt reports that she doesn't do anything relaxing on her days off, rarely sit unless it is [employer], went to beach with 's best friend's wife but didn't have a good time because the other couple live and operate in a different way. Pt reports that she is triggered by lazy people, hard to love people, pt is annoyed by people. Pt reported that she dated someone before her  Shahzad Womack for 6 yrs, believed what he said, but he ex didn't want to fight, would just tell her things that she wanted to hear, nothing change now it is hard to take people at their word. Pt reports that she gets filled with anger when she thinks about her brother in law and his wife, they are dependent upon her mother in law, brother in [facility] wife not wanting to work. Pt reports that her 's friend wife asked for one of pt's non-alcoholic drink and she paused before she gave her some. During this session, this clinician used the following therapeutic modalities: Cognitive Behavioral Therapy    Substance Abuse was not addressed during this session. If the client is diagnosed with a co-occurring substance use disorder, please indicate any changes in the frequency or amount of use: none. Stage of change for addressing substance use diagnoses: No substance use/Not applicable    ASSESSMENT:  Judson Lama presents with a Anxious mood.      her affect is Normal range and intensity, which is congruent, with her mood and the content of the session. The client has made progress on their goals. Maciej Alicea presents with a none risk of suicide, none risk of self-harm, and none risk of harm to others. For any risk assessment that surpasses a "low" rating, a safety plan must be developed. A safety plan was indicated: no  If yes, describe in detail     PLAN: Between sessions, Maciej Alicea will continue to work on boundaries issues with extended family and friends. At the next session, the therapist will use Mindfulness-based Strategies to address stress and anxiety issues surrounding what to do with her quality time. Behavioral Health Treatment Plan and Discharge Planning: Maciej Alicea is aware of and agrees to continue to work on their treatment plan. They have identified and are working toward their discharge goals. yes    Visit start and stop times:    09/22/23  Start Time: 0804  Stop Time: 2822  Total Visit Time: 48 minutes    Virtual Regular Visit    Verification of patient location:    Patient is located at Home in the following state in which I hold an active license [place]      Assessment/Plan:    Problem List Items Addressed This Visit    None  Visit Diagnoses     Generalized anxiety disorder    -  Primary          Goals addressed in session: Goal 1          Reason for visit is   Chief Complaint   Patient presents with   • Virtual Regular Visit        Encounter provider Butler Sicard, SW    Provider located at [address] [place] [address]  [phone]      Recent Visits  No visits were found meeting these conditions.   Showing recent visits within past 7 days and meeting all other requirements  Today's Visits  Date Type Provider Dept   09/22/23 Telemedicine Butler Sicard, [address] Therapist op   Showing today's visits and meeting all other requirements  Future Appointments  No visits were found meeting these conditions. Showing future appointments within next 150 days and meeting all other requirements       The patient was identified by name and date of birth. Kiara Momin was informed that this is a telemedicine visit and that the visit is being conducted throughthe [employer] platform. She agrees to proceed. .  My office door was closed. No one else was in the room. She acknowledged consent and understanding of privacy and security of the video platform. The patient has agreed to participate and understands they can discontinue the visit at any time. Patient is aware this is a billable service. Subjective  Kiara Momin is a 29 y.o. female        HPI     Past Medical History:   Diagnosis Date   • Anxiety        Past Surgical History:   Procedure Laterality Date   • TONSILLECTOMY         Current Outpatient Medications   Medication Sig Dispense Refill   • albuterol (Proventil HFA) 90 mcg/act inhaler Inhale 2 puffs every 4 (four) hours as needed for wheezing or shortness of breath (persistent cough) 6.7 g 0   • ALPRAZolam (XANAX) 0.5 mg tablet Take 1 tablet (0.5 mg total) by mouth 2 (two) times a day as needed for anxiety 30 tablet 0   • amphetamine-dextroamphetamine (ADDERALL) 10 mg tablet Take 1 tablet (10 mg total) by mouth daily Max Daily Amount: 10 mg 30 tablet 0   • benzonatate (TESSALON) 200 MG capsule Take 1 capsule (200 mg total) by mouth 3 (three) times a day as needed for cough (Patient not taking: Reported on 6/8/2023) 20 capsule 0   • erythromycin (ILOTYCIN) ophthalmic ointment Administer 0.5 inches into the left eye every 6 (six) hours 3.5 g 0   • VENTOLIN  (90 Base) MCG/ACT inhaler        No current facility-administered medications for this visit. No Known Allergies    Review of Systems    Video Exam    There were no vitals filed for this visit.     Physical Exam     Visit Time

## 2023-10-10 ENCOUNTER — TELEMEDICINE (OUTPATIENT)
Dept: BEHAVIORAL/MENTAL HEALTH CLINIC | Facility: CLINIC | Age: 33
End: 2023-10-10
Payer: COMMERCIAL

## 2023-10-10 DIAGNOSIS — F41.1 GENERALIZED ANXIETY DISORDER: Primary | ICD-10-CM

## 2023-10-10 PROCEDURE — 90834 PSYTX W PT 45 MINUTES: CPT | Performed by: SOCIAL WORKER
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
## 2023-10-24 ENCOUNTER — TELEMEDICINE (OUTPATIENT)
Dept: BEHAVIORAL/MENTAL HEALTH CLINIC | Facility: CLINIC | Age: 33
End: 2023-10-24
Payer: COMMERCIAL

## 2023-10-24 DIAGNOSIS — F41.1 GAD (GENERALIZED ANXIETY DISORDER): Primary | ICD-10-CM

## 2023-10-24 PROCEDURE — 90834 PSYTX W PT 45 MINUTES: CPT | Performed by: SOCIAL WORKER

## 2023-10-24 NOTE — PSYCH
Behavioral Health Psychotherapy Progress Note    Psychotherapy Provided: Individual Psychotherapy     1. HOWIE (generalized anxiety disorder)            Goals addressed in session: Goal 1     DATA: Pt reports that she has been trying to stay busy and focus on self, trying to work on own life issues and avoid focusing on family issues. 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 Pt reports that the her job is on call 7 days, every 6 weeks, 24 hour, needed to be available at anytime now it will be stretched out to every 12 weeks, now it will be 4x out of year. Pt reported that she has prayed to God to bless her block her from getting new role at work. 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 Pt reports that she is very sensitive to her family problems needs to learn to not be bothered by others problems. Pt reports that she needs to stop fixing stuff, accept things as they are. Pt reports that she worked 3, 12 hour shifts, did laundry, called her sponsor, she has a 100 things that she needs to do, never relaxed until the end of the day when she is eating dinner. Pt reports that she feels that she is on edge, feels anxious all the time, worry less. During this session, this clinician used the following therapeutic modalities: Cognitive Behavioral Therapy    Substance Abuse was not addressed during this session. If the client is diagnosed with a co-occurring substance use disorder, please indicate any changes in the frequency or amount of use: none. Stage of change for addressing substance use diagnoses: No substance use/Not applicable    ASSESSMENT:  Sabiha Borja presents with a Anxious mood. her affect is Normal range and intensity, which is congruent, with her mood and the content of the session. The client has made progress on their goals.      Sabiha Borja presents with a none risk of suicide, none risk of self-harm, and none risk of harm to others. For any risk assessment that surpasses a "low" rating, a safety plan must be developed. A safety plan was indicated: no  If yes, describe in detail     PLAN: Between sessions, Yuval Conklin will learn to address issues by tabling things when necessary, start day with prayer, being grateful for things she have distract herself from other people's personal problems. At the next session, the therapist will use Mindfulness-based Strategies to address help pt stay in the present, focus on the here and now. Behavioral Health Treatment Plan and Discharge Planning: Yuval Conklin is aware of and agrees to continue to work on their treatment plan. They have identified and are working toward their discharge goals. yes    Visit start and stop times:    10/24/23  Start Time: 1000  Stop Time: 1046  Total Visit Time: 46 minutes  Virtual Regular Visit    Verification of patient location:    Patient is located at Home in the following state in which I hold an active license [place]      Assessment/Plan:    Problem List Items Addressed This Visit    None  Visit Diagnoses       HOWIE (generalized anxiety disorder)    -  Primary            Goals addressed in session: Goal 1          Reason for visit is   Chief Complaint   Patient presents with    Virtual Regular Visit          Encounter provider JEREMIAH Espinoza    Provider located at [address] [place] [address]  [phone]      Recent Visits  No visits were found meeting these conditions.   Showing recent visits within past 7 days and meeting all other requirements  Today's Visits  Date Type Provider Dept   10/24/23 Telemedicine [address] Therapist [facility]   Showing today's visits and meeting all other requirements  Future Appointments  No visits were found meeting these conditions. Showing future appointments within next 150 days and meeting all other requirements       The patient was identified by name and date of birth. Sabiha Borja was informed that this is a telemedicine visit and that the visit is being conducted throughthe [employer] platform. She agrees to proceed. .  My office door was closed. No one else was in the room. She acknowledged consent and understanding of privacy and security of the video platform. The patient has agreed to participate and understands they can discontinue the visit at any time. Patient is aware this is a billable service. Subjective  Sabiha Borja is a 29 y.o. female       HPI     Past Medical History:   Diagnosis Date    Anxiety        Past Surgical History:   Procedure Laterality Date    TONSILLECTOMY         Current Outpatient Medications   Medication Sig Dispense Refill    albuterol (Proventil HFA) 90 mcg/act inhaler Inhale 2 puffs every 4 (four) hours as needed for wheezing or shortness of breath (persistent cough) 6.7 g 0    ALPRAZolam (XANAX) 0.5 mg tablet Take 1 tablet (0.5 mg total) by mouth 2 (two) times a day as needed for anxiety 30 tablet 0    amphetamine-dextroamphetamine (ADDERALL) 10 mg tablet Take 1 tablet (10 mg total) by mouth daily Max Daily Amount: 10 mg 30 tablet 0    benzonatate (TESSALON) 200 MG capsule Take 1 capsule (200 mg total) by mouth 3 (three) times a day as needed for cough (Patient not taking: Reported on 6/8/2023) 20 capsule 0    erythromycin (ILOTYCIN) ophthalmic ointment Administer 0.5 inches into the left eye every 6 (six) hours 3.5 g 0    VENTOLIN  (90 Base) MCG/ACT inhaler        No current facility-administered medications for this visit. No Known Allergies    Review of Systems    Video Exam    There were no vitals filed for this visit.     Physical Exam     Visit Time

## 2023-11-09 ENCOUNTER — TELEMEDICINE (OUTPATIENT)
Dept: BEHAVIORAL/MENTAL HEALTH CLINIC | Facility: CLINIC | Age: 33
End: 2023-11-09
Payer: COMMERCIAL

## 2023-11-09 DIAGNOSIS — F41.1 GAD (GENERALIZED ANXIETY DISORDER): Primary | ICD-10-CM

## 2023-11-09 DIAGNOSIS — F98.8 ADD (ATTENTION DEFICIT DISORDER) WITHOUT HYPERACTIVITY: ICD-10-CM

## 2023-11-09 PROCEDURE — 90834 PSYTX W PT 45 MINUTES: CPT | Performed by: SOCIAL WORKER

## 2023-11-09 NOTE — PSYCH
Virtual Regular Visit    Verification of patient location:    Patient is located at Home in the following state in which I hold an active license [place]      Assessment/Plan:    Problem List Items Addressed This Visit       ADD (attention deficit disorder) without hyperactivity     Other Visit Diagnoses       HOWIE (generalized anxiety disorder)    -  Primary            Goals addressed in session: Goal 1          Reason for visit is   Chief Complaint   Patient presents with    Virtual Regular Visit          Encounter provider JEREMIAH Pepper    Provider located at [address] [place] [id number] [phone]      Recent Visits  No visits were found meeting these conditions. Showing recent visits within past 7 days and meeting all other requirements  Today's Visits  Date Type Provider Dept   11/09/23 Telemedicine Margaret Gallegos [address] Therapist [facility]   Showing today's visits and meeting all other requirements  Future Appointments  No visits were found meeting these conditions. Showing future appointments within next 150 days and meeting all other requirements       The patient was identified by name and date of birth. Jeralyn Merlin was informed that this is a telemedicine visit and that the visit is being conducted throughthe [employer] platform. She agrees to proceed. .  My office door was closed. No one else was in the room. She acknowledged consent and understanding of privacy and security of the video platform. The patient has agreed to participate and understands they can discontinue the visit at any time. Patient is aware this is a billable service.      Subjective  Jeralyn Merlin is a 29 y.o. female        HPI     Past Medical History:   Diagnosis Date    Anxiety        Past Surgical History:   Procedure Laterality Date    TONSILLECTOMY         Current Outpatient Medications   Medication Sig Dispense Refill    albuterol (Proventil HFA) 90 mcg/act inhaler Inhale 2 puffs every 4 (four) hours as needed for wheezing or shortness of breath (persistent cough) 6.7 g 0    ALPRAZolam (XANAX) 0.5 mg tablet Take 1 tablet (0.5 mg total) by mouth 2 (two) times a day as needed for anxiety 30 tablet 0    amphetamine-dextroamphetamine (ADDERALL) 10 mg tablet Take 1 tablet (10 mg total) by mouth daily Max Daily Amount: 10 mg 30 tablet 0    benzonatate (TESSALON) 200 MG capsule Take 1 capsule (200 mg total) by mouth 3 (three) times a day as needed for cough (Patient not taking: Reported on 6/8/2023) 20 capsule 0    erythromycin (ILOTYCIN) ophthalmic ointment Administer 0.5 inches into the left eye every 6 (six) hours 3.5 g 0    VENTOLIN  (90 Base) MCG/ACT inhaler        No current facility-administered medications for this visit. No Known Allergies    Review of Systems    Video Exam    There were no vitals filed for this visit. Physical Exam     Visit Time  Behavioral Health Psychotherapy Progress Note    Psychotherapy Provided: Individual Psychotherapy     1. HOWIE (generalized anxiety disorder)        2. ADD (attention deficit disorder) without hyperactivity            Goals addressed in session: Goal 1     DATA: Pt reports that she feels less stressed since starting therapy, she has not be obsessing over her 's family. Pt reports that she has orientation for school tomorrow she haven't bought the book for test in December. 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 Pt reports that she is wondering if she got the job she interviewed for. Pt reports that she may end up dying for alzheimer like her grandmother, it is fear that she has. During this session, this clinician used the following therapeutic modalities: Cognitive Behavioral Therapy    Substance Abuse was addressed during this session.  If the client is diagnosed with a co-occurring substance use disorder, please indicate any changes in the frequency or amount of use: none. Stage of change for addressing substance use diagnoses: No substance use/Not applicable    ASSESSMENT:  Andrew Evangelista presents with a Anxious mood. her affect is Normal range and intensity, which is congruent, with her mood and the content of the session. The client has made progress on their goals. Andrew Evangelista presents with a none risk of suicide, none risk of self-harm, and none risk of harm to others. For any risk assessment that surpasses a "low" rating, a safety plan must be developed. A safety plan was indicated: no  If yes, describe in detail     PLAN: Between sessions, Andrew Evangelista will continue balance her work life and home life family concerns. At the next session, the therapist will use Cognitive Behavioral Therapy to address negative thinking and understanding the impact negative cognitions have on her process to healing, learning to have a positive dialogue going on so that she can have process. Behavioral Health Treatment Plan and Discharge Planning: Andrew Evangelista is aware of and agrees to continue to work on their treatment plan. They have identified and are working toward their discharge goals.  yes    Visit start and stop times:    11/09/23  Start Time: 0902  Stop Time: 7578  Total Visit Time: 45 minutes

## 2023-11-15 ENCOUNTER — TELEPHONE (OUTPATIENT)
Dept: FAMILY MEDICINE CLINIC | Facility: CLINIC | Age: 33
End: 2023-11-15

## 2023-11-15 DIAGNOSIS — Z01.84 IMMUNITY STATUS TESTING: Primary | ICD-10-CM

## 2023-11-15 NOTE — TELEPHONE ENCOUNTER
Pt called and mentioned that she needs blood work placed for a varicella titer blood test , it is required for her school. She  would like that Ordered.

## 2023-11-30 ENCOUNTER — TELEMEDICINE (OUTPATIENT)
Dept: BEHAVIORAL/MENTAL HEALTH CLINIC | Facility: CLINIC | Age: 33
End: 2023-11-30
Payer: COMMERCIAL

## 2023-11-30 DIAGNOSIS — F41.1 GAD (GENERALIZED ANXIETY DISORDER): Primary | ICD-10-CM

## 2023-11-30 PROCEDURE — 90832 PSYTX W PT 30 MINUTES: CPT | Performed by: SOCIAL WORKER

## 2023-11-30 NOTE — PSYCH
Behavioral Health Psychotherapy Progress Note    Psychotherapy Provided: Individual Psychotherapy     1. HOWIE (generalized anxiety disorder)            Goals addressed in session: Goal 1     DATA: Pt reports that she needs to cut the session short because she is just getting in from working overnight. Pt reports that she has been bothered less by her 's family, focusing on [place], been going to meetings went with her sponsor, having a difficult time dealing with  family from time to time. Pt reports that she has only being going to one meeting a week with her schedule all over the place, spending less time in the gym. Pt reports that she will bake cookies with her friend on Saturday at 6pm, not sure she wants to go. Pt reports that she also have a party at work that she is taking her  also. During this session, this clinician used the following therapeutic modalities: Client-centered Therapy    Substance Abuse was addressed during this session. If the client is diagnosed with a co-occurring substance use disorder, please indicate any changes in the frequency or amount of use: none. Stage of change for addressing substance use diagnoses: No substance use/Not applicable    ASSESSMENT:  Judson Lama presents with a Anxious mood. her affect is Normal range and intensity, which is congruent, with her mood and the content of the session. The client has not made progress on their goals. Judson Lama presents with a none risk of suicide, none risk of self-harm, and none risk of harm to others. For any risk assessment that surpasses a "low" rating, a safety plan must be developed. A safety plan was indicated: no  If yes, describe in detail     PLAN: Between sessions, Judson Lama will work on making a better schedule for herself, talk to  about better quality of life, spend quality time with cousin's wife making cookies.  At the next session, the therapist will use Mindfulness-based Strategies to address pt's struggle with relaxation and self-care activities, being less harder on herself. Behavioral Health Treatment Plan and Discharge Planning: Andrew Evangelista is aware of and agrees to continue to work on their treatment plan. They have identified and are working toward their discharge goals. yes    Visit start and stop times:    11/30/23  Start Time: 0805  Stop Time: 5977  Total Visit Time: 17 minutes  Virtual Regular Visit    Verification of patient location:    Patient is located at Home in the following state in which I hold an active license [place]      Assessment/Plan:    Problem List Items Addressed This Visit    None  Visit Diagnoses       HOWIE (generalized anxiety disorder)    -  Primary            Goals addressed in session: Goal 1          Reason for visit is   Chief Complaint   Patient presents with    Virtual Regular Visit          Encounter provider JEREMIAH Maxwell    Provider located at [address] [place] [address]  [phone]      Recent Visits  No visits were found meeting these conditions. Showing recent visits within past 7 days and meeting all other requirements  Today's Visits  Date Type Provider Dept   11/30/23 Telemedicine [address] Therapist [facility]   Showing today's visits and meeting all other requirements  Future Appointments  No visits were found meeting these conditions. Showing future appointments within next 150 days and meeting all other requirements       The patient was identified by name and date of birth. Andrew Evangelista was informed that this is a telemedicine visit and that the visit is being conducted throughthe [employer] platform. She agrees to proceed. .  My office door was closed. No one else was in the room.   She acknowledged consent and understanding of privacy and security of the video platform. The patient has agreed to participate and understands they can discontinue the visit at any time. Patient is aware this is a billable service. Subjective  Kori Mcdermott is a 29 y.o. female       HPI     Past Medical History:   Diagnosis Date    Anxiety        Past Surgical History:   Procedure Laterality Date    TONSILLECTOMY         Current Outpatient Medications   Medication Sig Dispense Refill    albuterol (Proventil HFA) 90 mcg/act inhaler Inhale 2 puffs every 4 (four) hours as needed for wheezing or shortness of breath (persistent cough) 6.7 g 0    ALPRAZolam (XANAX) 0.5 mg tablet Take 1 tablet (0.5 mg total) by mouth 2 (two) times a day as needed for anxiety 30 tablet 0    amphetamine-dextroamphetamine (ADDERALL) 10 mg tablet Take 1 tablet (10 mg total) by mouth daily Max Daily Amount: 10 mg 30 tablet 0    benzonatate (TESSALON) 200 MG capsule Take 1 capsule (200 mg total) by mouth 3 (three) times a day as needed for cough (Patient not taking: Reported on 6/8/2023) 20 capsule 0    erythromycin (ILOTYCIN) ophthalmic ointment Administer 0.5 inches into the left eye every 6 (six) hours 3.5 g 0    VENTOLIN  (90 Base) MCG/ACT inhaler        No current facility-administered medications for this visit. No Known Allergies    Review of Systems    Video Exam    There were no vitals filed for this visit.     Physical Exam     Visit Time

## 2023-11-30 NOTE — PSYCH
Virtual Regular Visit    Verification of patient location:    Patient is located at {[facility] Virtual Patient Location:[address]} in the following state in which I hold an active license {[facility] virtual patient location:[address]}      Assessment/Plan:    Problem List Items Addressed This Visit    None      Goals addressed in session: {GOALS:89718}          Reason for visit is   Chief Complaint   Patient presents with   • Virtual Regular Visit          Encounter provider Francisca JeffriesJEREMIAH    Provider located at [id number] [place] [id number] [phone]      Recent Visits  No visits were found meeting these conditions. Showing recent visits within past 7 days and meeting all other requirements  Today's Visits  Date Type Provider Dept   11/30/23 Telemedicine Francisca Mervin, [address] [facility]   Showing today's visits and meeting all other requirements  Future Appointments  No visits were found meeting these conditions. Showing future appointments within next 150 days and meeting all other requirements       The patient was identified by name and date of birth. Abbey Donnelly was informed that this is a telemedicine visit and that the visit is being conducted through{[facility] VIRTUAL VISIT GTTBRL:85891}. {Telemedicine confidentiality :28052} {Telemedicine participants:74916}  She acknowledged consent and understanding of privacy and security of the video platform. The patient has agreed to participate and understands they can discontinue the visit at any time. Patient is aware this is a billable service. Subjective  Abbey Donnelly is a 29 y.o. female *** .       HPI     Past Medical History:   Diagnosis Date   • Anxiety        Past Surgical History:   Procedure Laterality Date   • TONSILLECTOMY         Current Outpatient Medications   Medication Sig Dispense Refill   • albuterol (Proventil HFA) 90 mcg/act inhaler Inhale 2 puffs every 4 (four) hours as needed for wheezing or shortness of breath (persistent cough) 6.7 g 0   • ALPRAZolam (XANAX) 0.5 mg tablet Take 1 tablet (0.5 mg total) by mouth 2 (two) times a day as needed for anxiety 30 tablet 0   • amphetamine-dextroamphetamine (ADDERALL) 10 mg tablet Take 1 tablet (10 mg total) by mouth daily Max Daily Amount: 10 mg 30 tablet 0   • benzonatate (TESSALON) 200 MG capsule Take 1 capsule (200 mg total) by mouth 3 (three) times a day as needed for cough (Patient not taking: Reported on 6/8/2023) 20 capsule 0   • erythromycin (ILOTYCIN) ophthalmic ointment Administer 0.5 inches into the left eye every 6 (six) hours 3.5 g 0   • VENTOLIN  (90 Base) MCG/ACT inhaler        No current facility-administered medications for this visit. No Known Allergies    Review of Systems    Video Exam    There were no vitals filed for this visit.     Physical Exam     Visit Time    Visit Start Time: ***  Visit Stop Time: ***  Total Visit Duration: {Psych Total Visit Time:34904}

## 2023-12-01 ENCOUNTER — TELEPHONE (OUTPATIENT)
Dept: FAMILY MEDICINE CLINIC | Facility: CLINIC | Age: 33
End: 2023-12-01

## 2023-12-01 DIAGNOSIS — Z13.6 SCREENING FOR CARDIOVASCULAR CONDITION: ICD-10-CM

## 2023-12-01 DIAGNOSIS — Z01.84 IMMUNITY STATUS TESTING: Primary | ICD-10-CM

## 2023-12-01 LAB
ALBUMIN SERPL-MCNC: 4.8 G/DL (ref 3.6–5.1)
ALBUMIN/GLOB SERPL: 1.5 (CALC) (ref 1–2.5)
ALP SERPL-CCNC: 47 U/L (ref 31–125)
ALT SERPL-CCNC: 19 U/L (ref 6–29)
AST SERPL-CCNC: 19 U/L (ref 10–30)
BILIRUB SERPL-MCNC: 1.3 MG/DL (ref 0.2–1.2)
BUN SERPL-MCNC: 12 MG/DL (ref 7–25)
BUN/CREAT SERPL: ABNORMAL (CALC) (ref 6–22)
CALCIUM SERPL-MCNC: 9.6 MG/DL (ref 8.6–10.2)
CHLORIDE SERPL-SCNC: 102 MMOL/L (ref 98–110)
CHOLEST SERPL-MCNC: 149 MG/DL
CHOLEST/HDLC SERPL: 2.2 (CALC)
CO2 SERPL-SCNC: 27 MMOL/L (ref 20–32)
CREAT SERPL-MCNC: 0.68 MG/DL (ref 0.5–0.97)
GFR/BSA.PRED SERPLBLD CYS-BASED-ARV: 119 ML/MIN/1.73M2
GLOBULIN SER CALC-MCNC: 3.2 G/DL (CALC) (ref 1.9–3.7)
GLUCOSE SERPL-MCNC: 84 MG/DL (ref 65–99)
HDLC SERPL-MCNC: 69 MG/DL
LDLC SERPL CALC-MCNC: 67 MG/DL (CALC)
NONHDLC SERPL-MCNC: 80 MG/DL (CALC)
POTASSIUM SERPL-SCNC: 4.3 MMOL/L (ref 3.5–5.3)
PROT SERPL-MCNC: 8 G/DL (ref 6.1–8.1)
SODIUM SERPL-SCNC: 136 MMOL/L (ref 135–146)
TRIGL SERPL-MCNC: 58 MG/DL
VZV IGG SER IA-ACNC: 887.2 INDEX

## 2023-12-04 ENCOUNTER — TELEPHONE (OUTPATIENT)
Dept: FAMILY MEDICINE CLINIC | Facility: CLINIC | Age: 33
End: 2023-12-04

## 2023-12-04 NOTE — TELEPHONE ENCOUNTER
Bilirubin slightly elevated. Other liver enzymes are within normal ranges. No concerns. Can repeat in 3-6 months.

## 2023-12-04 NOTE — TELEPHONE ENCOUNTER
Patient is aware and wants to ask Tiera if she should be concerned that her bilirubin was flagged and went up since last time.

## 2023-12-04 NOTE — TELEPHONE ENCOUNTER
----- Message from [address] [place] [address] sent at 12/4/2023 10:50 AM EST -----  Labs look good. Varicella shows antibodies detected.

## 2023-12-05 ENCOUNTER — CLINICAL SUPPORT (OUTPATIENT)
Dept: FAMILY MEDICINE CLINIC | Facility: CLINIC | Age: 33
End: 2023-12-05

## 2023-12-05 ENCOUNTER — TELEMEDICINE (OUTPATIENT)
Dept: BEHAVIORAL/MENTAL HEALTH CLINIC | Facility: CLINIC | Age: 33
End: 2023-12-05
Payer: COMMERCIAL

## 2023-12-05 DIAGNOSIS — Z11.1 SCREENING FOR TUBERCULOSIS: Primary | ICD-10-CM

## 2023-12-05 DIAGNOSIS — F41.1 GAD (GENERALIZED ANXIETY DISORDER): Primary | ICD-10-CM

## 2023-12-05 PROCEDURE — 86580 TB INTRADERMAL TEST: CPT

## 2023-12-05 PROCEDURE — 90834 PSYTX W PT 45 MINUTES: CPT | Performed by: SOCIAL WORKER

## 2023-12-05 NOTE — PSYCH
Behavioral Health Psychotherapy Progress Note    Psychotherapy Provided: Individual Psychotherapy     1. HOWIE (generalized anxiety disorder)            Goals addressed in session: Goal 1     DATA: Pt reports that she had a bad day with her sister in law, her sister in law doesn't want to go back to work, pt is frustrated with her extended family. Pt reports that her  is going away for 5 weeks for a new rank or promotion. Pt reports that she will need her mother in law to help with dog because she will be working 14 hours a day and need help with letting her dog out. Pt reports that her mother in law makes her uncomfortable because she is constantly complaining about her sister in law but won't make any changes. Pt reports that she feels anxiety attacks when she open herself for school, they said her test was December 13th, needed to pay for exam December 15th, they mixed the date up. Pt reports that she went to the gym with her  this morning and she feels happy about working out. Pt reports that the gym is closer to her and cheaper than [facility] and her  meal prep for the week, have healthy meals. Pt reports that she feels all over the place, may need to a new job, feel overwhelmed by her  going away. During this session, this clinician used the following therapeutic modalities: Client-centered Therapy    Substance Abuse was not addressed during this session. If the client is diagnosed with a co-occurring substance use disorder, please indicate any changes in the frequency or amount of use: none. Stage of change for addressing substance use diagnoses: No substance use/Not applicable    ASSESSMENT:  Juliette Huff presents with a Anxious mood. her affect is Normal range and intensity, which is congruent, with her mood and the content of the session. The client has made progress on their goals.      Juliette Huff presents with a none risk of suicide, none risk of self-harm, and none risk of harm to others. For any risk assessment that surpasses a "low" rating, a safety plan must be developed. A safety plan was indicated: no  If yes, describe in detail     PLAN: Between sessions, Jamie Perez will continue to work out at the gym to reduce and release stress with her . At the next session, the therapist will use Solution-Focused Therapy to address pt's lack of support with caring for her dog. Behavioral Health Treatment Plan and Discharge Planning: Jamie Perez is aware of and agrees to continue to work on their treatment plan. They have identified and are working toward their discharge goals. yes    Visit start and stop times:    12/05/23  Start Time: 1002  Stop Time: 1047  Total Visit Time: 45 minutes  Virtual Regular Visit    Verification of patient location:    Patient is located at Home in the following state in which I hold an active license [place]      Assessment/Plan:    Problem List Items Addressed This Visit    None  Visit Diagnoses       HOWIE (generalized anxiety disorder)    -  Primary            Goals addressed in session: Goal 1          Reason for visit is   Chief Complaint   Patient presents with    Virtual Regular Visit          Encounter provider JEREMIAH Concepcion    Provider located at [address] [place] [address]  [phone]      Recent Visits  Date Type Provider Dept   11/30/23 Telemedicine [address] [facility]ist [facility]   Showing recent visits within past 7 days and meeting all other requirements  Today's Visits  Date Type Provider Dept   12/05/23 Telemedicine [address] [facility]   Showing today's visits and meeting all other requirements  Future Appointments  No visits were found meeting these conditions.   Showing future appointments within next 150 days and meeting all other requirements       The patient was identified by name and date of birth. Baldomero Ivy was informed that this is a telemedicine visit and that the visit is being conducted throughthe Lat49 platform. She agrees to proceed. .  My office door was closed. No one else was in the room. She acknowledged consent and understanding of privacy and security of the video platform. The patient has agreed to participate and understands they can discontinue the visit at any time. Patient is aware this is a billable service. Subjective  Baldomero Ivy is a 29 y.o. female       HPI     Past Medical History:   Diagnosis Date    Anxiety        Past Surgical History:   Procedure Laterality Date    TONSILLECTOMY         Current Outpatient Medications   Medication Sig Dispense Refill    albuterol (Proventil HFA) 90 mcg/act inhaler Inhale 2 puffs every 4 (four) hours as needed for wheezing or shortness of breath (persistent cough) 6.7 g 0    ALPRAZolam (XANAX) 0.5 mg tablet Take 1 tablet (0.5 mg total) by mouth 2 (two) times a day as needed for anxiety 30 tablet 0    amphetamine-dextroamphetamine (ADDERALL) 10 mg tablet Take 1 tablet (10 mg total) by mouth daily Max Daily Amount: 10 mg 30 tablet 0    benzonatate (TESSALON) 200 MG capsule Take 1 capsule (200 mg total) by mouth 3 (three) times a day as needed for cough (Patient not taking: Reported on 6/8/2023) 20 capsule 0    erythromycin (ILOTYCIN) ophthalmic ointment Administer 0.5 inches into the left eye every 6 (six) hours 3.5 g 0    VENTOLIN  (90 Base) MCG/ACT inhaler        No current facility-administered medications for this visit. No Known Allergies    Review of Systems    Video Exam    There were no vitals filed for this visit.     Physical Exam     Visit Time

## 2023-12-07 ENCOUNTER — CLINICAL SUPPORT (OUTPATIENT)
Dept: FAMILY MEDICINE CLINIC | Facility: CLINIC | Age: 33
End: 2023-12-07

## 2023-12-07 DIAGNOSIS — Z11.1 ENCOUNTER FOR PPD SKIN TEST READING: Primary | ICD-10-CM

## 2023-12-07 LAB
INDURATION: 0 MM
TB SKIN TEST: NEGATIVE

## 2024-01-04 ENCOUNTER — TELEPHONE (OUTPATIENT)
Dept: PSYCHIATRY | Facility: CLINIC | Age: 34
End: 2024-01-04

## 2024-01-04 NOTE — TELEPHONE ENCOUNTER
Clt states she rec'd VM from provider informing her of her leave of absence. Writer confirmed that therapist will return at the end of March. Clt would like to wait until therapist returns to schedule. Writer reiterated that if she decides to change her mind and would like services temporarily with someone else, just call us back to schedule.

## 2024-02-21 DIAGNOSIS — F41.9 ANXIETY: ICD-10-CM

## 2024-02-21 NOTE — TELEPHONE ENCOUNTER
RX VOICEMAIL BOX    [facility] [phone]. Patient has a physical scheduled with [facility] in [place]. Will be leaving our practice in April due to her move.

## 2024-02-23 RX ORDER — ALPRAZOLAM 0.5 MG/1
0.5 TABLET ORAL 2 TIMES DAILY PRN
Qty: 30 TABLET | Refills: 0 | Status: SHIPPED | OUTPATIENT
Start: 2024-02-23

## 2024-03-07 ENCOUNTER — DOCUMENTATION (OUTPATIENT)
Dept: PSYCHIATRY | Facility: CLINIC | Age: 34
End: 2024-03-07

## 2024-03-21 ENCOUNTER — OFFICE VISIT (OUTPATIENT)
Dept: FAMILY MEDICINE CLINIC | Facility: CLINIC | Age: 34
End: 2024-03-21
Payer: COMMERCIAL

## 2024-03-21 VITALS
HEIGHT: 65 IN | OXYGEN SATURATION: 99 % | SYSTOLIC BLOOD PRESSURE: 116 MMHG | TEMPERATURE: 96.9 F | HEART RATE: 78 BPM | DIASTOLIC BLOOD PRESSURE: 70 MMHG | BODY MASS INDEX: 22.82 KG/M2 | WEIGHT: 137 LBS

## 2024-03-21 DIAGNOSIS — R53.83 OTHER FATIGUE: Primary | ICD-10-CM

## 2024-03-21 DIAGNOSIS — J01.00 ACUTE NON-RECURRENT MAXILLARY SINUSITIS: ICD-10-CM

## 2024-03-21 PROCEDURE — 99214 OFFICE O/P EST MOD 30 MIN: CPT

## 2024-03-21 RX ORDER — AMOXICILLIN 500 MG/1
500 CAPSULE ORAL EVERY 8 HOURS SCHEDULED
Qty: 21 CAPSULE | Refills: 0 | Status: SHIPPED | OUTPATIENT
Start: 2024-03-21 | End: 2024-03-28

## 2024-03-21 RX ORDER — PREDNISONE 20 MG/1
TABLET ORAL
Qty: 15 TABLET | Refills: 0 | Status: SHIPPED | OUTPATIENT
Start: 2024-03-21

## 2024-03-21 RX ORDER — FLUTICASONE PROPIONATE 50 MCG
1 SPRAY, SUSPENSION (ML) NASAL DAILY
Qty: 16 G | Refills: 2 | Status: SHIPPED | OUTPATIENT
Start: 2024-03-21

## 2024-03-21 NOTE — PROGRESS NOTES
Name: Brooke Messina      : 1990      MRN: [id number]  Encounter Provider: CHARLES Quick  Encounter Date: 3/21/2024   Encounter department: [facility]    Assessment & Plan     1. Other fatigue  -     T4, free; Future  -     TSH, 3rd generation; Future  -     Cortisol; Future  -     CBC and differential; Future  -     Comprehensive metabolic panel; Future  -     T4, free  -     TSH, 3rd generation  -     Cortisol  -     CBC and differential  -     Comprehensive metabolic panel    2. Acute non-recurrent maxillary sinusitis  -     amoxicillin (AMOXIL) 500 mg capsule; Take 1 capsule (500 mg total) by mouth every 8 (eight) hours for 7 days  -     predniSONE 20 mg tablet; TAKE 1 TABLET BID X 5 DAYS THEN TAKE 1 TABLET QD FOR 5 DAYS  -     fluticasone (FLONASE) 50 mcg/act nasal spray; 1 spray into each nostril daily           Subjective      Sore Throat   This is a new problem. The current episode started 1 to 4 weeks ago. The problem has been gradually improving. Neither side of throat is experiencing more pain than the other. There has been no fever (last fever 1.5 weeks). The fever has been present for 3 to 4 days. The pain is at a severity of 4/10. The pain is moderate. Associated symptoms include congestion, headaches and a plugged ear sensation. Pertinent negatives include no abdominal pain, coughing, diarrhea, drooling, ear discharge, ear pain, hoarse voice, neck pain, shortness of breath, stridor, swollen glands, trouble swallowing or vomiting. She has had exposure to strep. She has tried gargles, acetaminophen and NSAIDs for the symptoms. The treatment provided mild relief.     Review of Systems   Constitutional:  Positive for fatigue. Negative for activity change.   HENT:  Positive for congestion and sore throat. Negative for drooling, ear discharge, ear pain, hoarse voice, rhinorrhea and trouble swallowing.    Eyes:  Negative for pain.   Respiratory:   "Negative for cough, shortness of breath, wheezing and stridor.    Cardiovascular:  Negative for chest pain, palpitations and leg swelling.   Gastrointestinal:  Negative for abdominal pain, diarrhea, nausea and vomiting.   Musculoskeletal:  Negative for arthralgias, myalgias and neck pain.   Skin:  Negative for rash.   Neurological:  Positive for headaches. Negative for dizziness, weakness and numbness.   Psychiatric/Behavioral:  Negative for dysphoric mood. The patient is nervous/anxious.    All other systems reviewed and are negative.      Current Outpatient Medications on File Prior to Visit   Medication Sig    albuterol (Proventil HFA) 90 mcg/act inhaler Inhale 2 puffs every 4 (four) hours as needed for wheezing or shortness of breath (persistent cough)    ALPRAZolam (XANAX) 0.5 mg tablet Take 1 tablet (0.5 mg total) by mouth 2 (two) times a day as needed for anxiety    amphetamine-dextroamphetamine (ADDERALL) 10 mg tablet Take 1 tablet (10 mg total) by mouth daily Max Daily Amount: 10 mg    erythromycin (ILOTYCIN) ophthalmic ointment Administer 0.5 inches into the left eye every 6 (six) hours    VENTOLIN  (90 Base) MCG/ACT inhaler     [DISCONTINUED] benzonatate (TESSALON) 200 MG capsule Take 1 capsule (200 mg total) by mouth 3 (three) times a day as needed for cough (Patient not taking: Reported on 6/8/2023)       Objective     /70 (BP Location: Right arm, Patient Position: Sitting, Cuff Size: Standard)   Pulse 78   Temp (!) 96.9 °F (36.1 °C) (Tympanic)   Ht 5' 5\" (1.651 m)   Wt 62.1 kg (137 lb)   SpO2 99%   BMI 22.80 kg/m²     Physical Exam  Vitals and nursing note reviewed.   Constitutional:       General: She is not in acute distress.     Appearance: Normal appearance. She is not ill-appearing.   HENT:      Head: Normocephalic.      Right Ear: No drainage or swelling.      Left Ear: Tenderness present. Tympanic membrane is erythematous.      Nose: No congestion.      Mouth/Throat:      " Mouth: Mucous membranes are moist.      Pharynx: Uvula midline. Posterior oropharyngeal erythema present. No pharyngeal swelling.   Cardiovascular:      Rate and Rhythm: Normal rate and regular rhythm.      Heart sounds: Normal heart sounds. No murmur heard.  Pulmonary:      Effort: Pulmonary effort is normal. No respiratory distress.      Breath sounds: Normal breath sounds. No wheezing.   Abdominal:      General: Bowel sounds are normal.   Skin:     General: Skin is warm and dry.   Neurological:      General: No focal deficit present.      Mental Status: She is alert and oriented to person, place, and time. Mental status is at baseline.   Psychiatric:         Behavior: Behavior normal.       CHARLES Quick

## 2024-03-23 LAB
ALBUMIN SERPL-MCNC: 4.4 G/DL (ref 3.9–4.9)
ALBUMIN/GLOB SERPL: 1.5 {RATIO} (ref 1.2–2.2)
ALP SERPL-CCNC: 52 IU/L (ref 44–121)
ALT SERPL-CCNC: 15 IU/L (ref 0–32)
AST SERPL-CCNC: 21 IU/L (ref 0–40)
BASOPHILS # BLD AUTO: 0 X10E3/UL (ref 0–0.2)
BASOPHILS NFR BLD AUTO: 1 %
BILIRUB SERPL-MCNC: 1.5 MG/DL (ref 0–1.2)
BUN SERPL-MCNC: 13 MG/DL (ref 6–20)
BUN/CREAT SERPL: 18 (ref 9–23)
CALCIUM SERPL-MCNC: 9.4 MG/DL (ref 8.7–10.2)
CHLORIDE SERPL-SCNC: 100 MMOL/L (ref 96–106)
CO2 SERPL-SCNC: 21 MMOL/L (ref 20–29)
CORTIS SERPL-MCNC: 8.3 UG/DL (ref 6.2–19.4)
CREAT SERPL-MCNC: 0.71 MG/DL (ref 0.57–1)
EGFR: 116 ML/MIN/1.73
EOSINOPHIL # BLD AUTO: 0.1 X10E3/UL (ref 0–0.4)
EOSINOPHIL NFR BLD AUTO: 1 %
ERYTHROCYTE [DISTWIDTH] IN BLOOD BY AUTOMATED COUNT: 11.7 % (ref 11.7–15.4)
GLOBULIN SER-MCNC: 2.9 G/DL (ref 1.5–4.5)
GLUCOSE SERPL-MCNC: 83 MG/DL (ref 70–99)
HCT VFR BLD AUTO: 46.3 % (ref 34–46.6)
HGB BLD-MCNC: 15.1 G/DL (ref 11.1–15.9)
IMM GRANULOCYTES # BLD: 0 X10E3/UL (ref 0–0.1)
IMM GRANULOCYTES NFR BLD: 0 %
LYMPHOCYTES # BLD AUTO: 2.2 X10E3/UL (ref 0.7–3.1)
LYMPHOCYTES NFR BLD AUTO: 38 %
MCH RBC QN AUTO: 30.1 PG (ref 26.6–33)
MCHC RBC AUTO-ENTMCNC: 32.6 G/DL (ref 31.5–35.7)
MCV RBC AUTO: 92 FL (ref 79–97)
MONOCYTES # BLD AUTO: 0.4 X10E3/UL (ref 0.1–0.9)
MONOCYTES NFR BLD AUTO: 7 %
NEUTROPHILS # BLD AUTO: 3.1 X10E3/UL (ref 1.4–7)
NEUTROPHILS NFR BLD AUTO: 53 %
PLATELET # BLD AUTO: 305 X10E3/UL (ref 150–450)
POTASSIUM SERPL-SCNC: 4.2 MMOL/L (ref 3.5–5.2)
PROT SERPL-MCNC: 7.3 G/DL (ref 6–8.5)
RBC # BLD AUTO: 5.01 X10E6/UL (ref 3.77–5.28)
SODIUM SERPL-SCNC: 135 MMOL/L (ref 134–144)
T4 FREE SERPL-MCNC: 1.22 NG/DL (ref 0.82–1.77)
TSH SERPL DL<=0.005 MIU/L-ACNC: 2.52 UIU/ML (ref 0.45–4.5)
WBC # BLD AUTO: 5.8 X10E3/UL (ref 3.4–10.8)

## 2024-03-26 ENCOUNTER — PATIENT MESSAGE (OUTPATIENT)
Dept: FAMILY MEDICINE CLINIC | Facility: CLINIC | Age: 34
End: 2024-03-26

## 2024-03-28 LAB
BILIRUB DIRECT SERPL-MCNC: 0.28 MG/DL (ref 0–0.4)
BILIRUB INDIRECT SERPL-MCNC: 1.02 MG/DL (ref 0.1–0.8)
BILIRUB SERPL-MCNC: 1.3 MG/DL (ref 0–1.2)
LDH SERPL-CCNC: 157 IU/L (ref 119–226)

## 2024-04-18 ENCOUNTER — OFFICE VISIT (OUTPATIENT)
Dept: FAMILY MEDICINE CLINIC | Facility: CLINIC | Age: 34
End: 2024-04-18
Payer: COMMERCIAL

## 2024-04-18 VITALS
HEART RATE: 84 BPM | HEIGHT: 65 IN | SYSTOLIC BLOOD PRESSURE: 110 MMHG | TEMPERATURE: 96.6 F | BODY MASS INDEX: 23.16 KG/M2 | DIASTOLIC BLOOD PRESSURE: 74 MMHG | RESPIRATION RATE: 16 BRPM | WEIGHT: 139 LBS

## 2024-04-18 DIAGNOSIS — Z31.69 ENCOUNTER FOR PRECONCEPTION CONSULTATION: ICD-10-CM

## 2024-04-18 DIAGNOSIS — F98.8 ADD (ATTENTION DEFICIT DISORDER) WITHOUT HYPERACTIVITY: ICD-10-CM

## 2024-04-18 DIAGNOSIS — R17 ELEVATED BILIRUBIN: Primary | ICD-10-CM

## 2024-04-18 DIAGNOSIS — F41.9 ANXIETY: ICD-10-CM

## 2024-04-18 PROCEDURE — 99214 OFFICE O/P EST MOD 30 MIN: CPT | Performed by: FAMILY MEDICINE

## 2024-04-18 NOTE — ASSESSMENT & PLAN NOTE
Xanax discontinued because she is trying to get pregnant.   Sertraline started  Risks and benefits of medication discussed.

## 2024-04-18 NOTE — PROGRESS NOTES
Name: Brooke Messina      : 1990      MRN: [id number]  Encounter Provider: Shelley Brandon DO  Encounter Date: 2024   Encounter department: [facility]    Assessment & Plan     1. Elevated bilirubin  Comments:  recently elevated   will recheck now that she is off supplements  Orders:  -     CBC and differential; Future  -     Hepatic function panel; Future  -     Bilirubin, Total and Direct; Future  -     CBC and differential  -     Hepatic function panel  -     Bilirubin, Total and Direct    2. Encounter for preconception consultation  -     Ambulatory referral to Obstetrics / Gynecology; Future    3. Anxiety  Assessment & Plan:  Xanax discontinued because she is trying to get pregnant.   Sertraline started  Risks and benefits of medication discussed.      Orders:  -     sertraline (ZOLOFT) 50 mg tablet; Take 1 tablet (50 mg total) by mouth daily    4. ADD (attention deficit disorder) without hyperactivity  Assessment & Plan:  Not currently on medication because she is try to get pregnant       Return in about 2 months (around 2024) for Next scheduled follow up.    Subjective      She is here to establish for primary care.      She has been trying to get pregnant for 2 years intermittently.  Her  is in the  so he is not always home.    She has had anxiety for a long time. She has been taking xanax as needed. She has never had anything daily.    She has stopped her medication for her ADHD because she is trying to get pregnant and can feel her attention is all over the place. She is trying to help control it with healthy diet and exercise.           Review of Systems    Current Outpatient Medications on File Prior to Visit   Medication Sig   • albuterol (Proventil HFA) 90 mcg/act inhaler Inhale 2 puffs every 4 (four) hours as needed for wheezing or shortness of breath (persistent cough)   • [DISCONTINUED] ALPRAZolam (XANAX) 0.5 mg tablet Take 1 tablet (0.5 mg total) by  "mouth 2 (two) times a day as needed for anxiety   • [DISCONTINUED] amphetamine-dextroamphetamine (ADDERALL) 10 mg tablet Take 1 tablet (10 mg total) by mouth daily Max Daily Amount: 10 mg (Patient not taking: Reported on 4/18/2024)   • [DISCONTINUED] erythromycin (ILOTYCIN) ophthalmic ointment Administer 0.5 inches into the left eye every 6 (six) hours (Patient not taking: Reported on 4/18/2024)   • [DISCONTINUED] fluticasone (FLONASE) 50 mcg/act nasal spray 1 spray into each nostril daily (Patient not taking: Reported on 4/18/2024)   • [DISCONTINUED] predniSONE 20 mg tablet TAKE 1 TABLET BID X 5 DAYS THEN TAKE 1 TABLET QD FOR 5 DAYS (Patient not taking: Reported on 4/18/2024)   • [DISCONTINUED] VENTOLIN  (90 Base) MCG/ACT inhaler  (Patient not taking: Reported on 4/18/2024)       Objective     /74   Pulse 84   Temp (!) 96.6 °F (35.9 °C)   Resp 16   Ht 5' 5\" (1.651 m)   Wt 63 kg (139 lb)   LMP 03/29/2024 (Exact Date)   BMI 23.13 kg/m²     Physical Exam  Vitals and nursing note reviewed.   Constitutional:       Appearance: She is well-developed.   HENT:      Head: Normocephalic and atraumatic.      Right Ear: Tympanic membrane and external ear normal.      Left Ear: Tympanic membrane and external ear normal.   Cardiovascular:      Rate and Rhythm: Normal rate and regular rhythm.      Heart sounds: Normal heart sounds. No murmur heard.     No friction rub.   Pulmonary:      Effort: Pulmonary effort is normal. No respiratory distress.      Breath sounds: Normal breath sounds. No wheezing or rales.   Musculoskeletal:      Right lower leg: No edema.      Left lower leg: No edema.       Shelley Brandon, DO    "

## 2024-04-26 LAB
ALBUMIN SERPL-MCNC: 4.4 G/DL (ref 3.9–4.9)
ALP SERPL-CCNC: 47 IU/L (ref 44–121)
ALT SERPL-CCNC: 31 IU/L (ref 0–32)
AST SERPL-CCNC: 31 IU/L (ref 0–40)
BASOPHILS # BLD AUTO: 0 X10E3/UL (ref 0–0.2)
BASOPHILS NFR BLD AUTO: 0 %
BILIRUB DIRECT SERPL-MCNC: 0.27 MG/DL (ref 0–0.4)
BILIRUB INDIRECT SERPL-MCNC: 0.83 MG/DL (ref 0.1–0.8)
BILIRUB SERPL-MCNC: 1.1 MG/DL (ref 0–1.2)
EOSINOPHIL # BLD AUTO: 0.1 X10E3/UL (ref 0–0.4)
EOSINOPHIL NFR BLD AUTO: 1 %
ERYTHROCYTE [DISTWIDTH] IN BLOOD BY AUTOMATED COUNT: 12 % (ref 11.7–15.4)
HCT VFR BLD AUTO: 41.3 % (ref 34–46.6)
HGB BLD-MCNC: 14.3 G/DL (ref 11.1–15.9)
IMM GRANULOCYTES # BLD: 0 X10E3/UL (ref 0–0.1)
IMM GRANULOCYTES NFR BLD: 0 %
LYMPHOCYTES # BLD AUTO: 1.8 X10E3/UL (ref 0.7–3.1)
LYMPHOCYTES NFR BLD AUTO: 17 %
MCH RBC QN AUTO: 32.2 PG (ref 26.6–33)
MCHC RBC AUTO-ENTMCNC: 34.6 G/DL (ref 31.5–35.7)
MCV RBC AUTO: 93 FL (ref 79–97)
MONOCYTES # BLD AUTO: 0.6 X10E3/UL (ref 0.1–0.9)
MONOCYTES NFR BLD AUTO: 5 %
NEUTROPHILS # BLD AUTO: 8.1 X10E3/UL (ref 1.4–7)
NEUTROPHILS NFR BLD AUTO: 77 %
PLATELET # BLD AUTO: 277 X10E3/UL (ref 150–450)
PROT SERPL-MCNC: 7.3 G/DL (ref 6–8.5)
RBC # BLD AUTO: 4.44 X10E6/UL (ref 3.77–5.28)
WBC # BLD AUTO: 10.5 X10E3/UL (ref 3.4–10.8)

## 2024-05-10 DIAGNOSIS — F41.9 ANXIETY: ICD-10-CM

## 2024-05-28 ENCOUNTER — TELEMEDICINE (OUTPATIENT)
Dept: FAMILY MEDICINE CLINIC | Facility: CLINIC | Age: 34
End: 2024-05-28
Payer: COMMERCIAL

## 2024-05-28 DIAGNOSIS — L08.9 SKIN INFECTION: Primary | ICD-10-CM

## 2024-05-28 PROCEDURE — 99213 OFFICE O/P EST LOW 20 MIN: CPT | Performed by: FAMILY MEDICINE

## 2024-05-28 RX ORDER — CEPHALEXIN 500 MG/1
500 CAPSULE ORAL EVERY 12 HOURS SCHEDULED
Qty: 14 CAPSULE | Refills: 0 | Status: SHIPPED | OUTPATIENT
Start: 2024-05-28 | End: 2024-06-04

## 2024-05-28 NOTE — PROGRESS NOTES
Virtual Regular Visit    Verification of patient location:    Patient is located at Other in the following state in which I hold an active license [place]      Assessment/Plan:    Problem List Items Addressed This Visit    None  Visit Diagnoses     Skin infection    -  Primary    Relevant Medications    cephalexin (KEFLEX) 500 mg capsule        Worsening skin infection  Recommend that she keep the area covered for the next 2 to 3 days    Follow-up in the office if she does not improve  I also let her know that she can send me a picture of the area if she has questions       Reason for visit is   Chief Complaint   Patient presents with   • Wound Infection     Right lower leg above ankle. Possible infection. Got wound 5/21/24 Flora Gilbert LPN     • Virtual Regular Visit          Encounter provider Shelley Brandon DO      Recent Visits  No visits were found meeting these conditions.  Showing recent visits within past 7 days and meeting all other requirements  Today's Visits  Date Type Provider Dept   05/28/24 Telemedicine Shelley Brandon DO [facility]   Showing today's visits and meeting all other requirements  Future Appointments  No visits were found meeting these conditions.  Showing future appointments within next 150 days and meeting all other requirements       The patient was identified by name and date of birth. Brooke Messina was informed that this is a telemedicine visit and that the visit is being conducted through the Epic Embedded platform. She agrees to proceed..  My office door was closed. No one else was in the room.  She acknowledged consent and understanding of privacy and security of the video platform. The patient has agreed to participate and understands they can discontinue the visit at any time.    Patient is aware this is a billable service.     Subjective  Brooke Messina is a 34 y.o. female       She was outside with her dog and the lead go wrapped around leg and rubbed off a bunch of  skin.  The redness around it is getting worse today and it is getting more sore.          Past Medical History:   Diagnosis Date   • Anxiety        Past Surgical History:   Procedure Laterality Date   • DENTAL SURGERY     • TONSILLECTOMY         Current Outpatient Medications   Medication Sig Dispense Refill   • albuterol (Proventil HFA) 90 mcg/act inhaler Inhale 2 puffs every 4 (four) hours as needed for wheezing or shortness of breath (persistent cough) 6.7 g 0   • cephalexin (KEFLEX) 500 mg capsule Take 1 capsule (500 mg total) by mouth every 12 (twelve) hours for 7 days 14 capsule 0     No current facility-administered medications for this visit.        No Known Allergies    Review of Systems    Video Exam    There were no vitals filed for this visit.    Physical Exam  Vitals reviewed.   Constitutional:       General: She is not in acute distress.  Pulmonary:      Effort: Pulmonary effort is normal.   Skin:     Comments: Right anterior lower leg scabbed area with surrounding erythema, no noticeable discharge   Neurological:      Mental Status: She is alert.   Psychiatric:         Behavior: Behavior normal.         Thought Content: Thought content normal.         Judgment: Judgment normal.          Visit Time  Total Visit Duration: 6

## 2024-07-11 ENCOUNTER — CONSULT (OUTPATIENT)
Dept: OBGYN CLINIC | Facility: CLINIC | Age: 34
End: 2024-07-11
Payer: COMMERCIAL

## 2024-07-11 ENCOUNTER — APPOINTMENT (OUTPATIENT)
Dept: LAB | Facility: HOSPITAL | Age: 34
End: 2024-07-11
Payer: COMMERCIAL

## 2024-07-11 VITALS
DIASTOLIC BLOOD PRESSURE: 62 MMHG | SYSTOLIC BLOOD PRESSURE: 110 MMHG | BODY MASS INDEX: 22.82 KG/M2 | WEIGHT: 137 LBS | HEIGHT: 65 IN

## 2024-07-11 DIAGNOSIS — Z31.69 ENCOUNTER FOR PRECONCEPTION CONSULTATION: ICD-10-CM

## 2024-07-11 DIAGNOSIS — Z01.419 ENCOUNTER FOR ANNUAL ROUTINE GYNECOLOGICAL EXAMINATION: Primary | ICD-10-CM

## 2024-07-11 LAB
ESTRADIOL SERPL-MCNC: 149 PG/ML
FSH SERPL-ACNC: 4.3 MIU/ML
LH SERPL-ACNC: 8.1 MIU/ML

## 2024-07-11 PROCEDURE — 87340 HEPATITIS B SURFACE AG IA: CPT

## 2024-07-11 PROCEDURE — G0145 SCR C/V CYTO,THINLAYER,RESCR: HCPCS | Performed by: OBSTETRICS & GYNECOLOGY

## 2024-07-11 PROCEDURE — S0610 ANNUAL GYNECOLOGICAL EXAMINA: HCPCS | Performed by: OBSTETRICS & GYNECOLOGY

## 2024-07-11 PROCEDURE — 83002 ASSAY OF GONADOTROPIN (LH): CPT

## 2024-07-11 PROCEDURE — 36415 COLL VENOUS BLD VENIPUNCTURE: CPT

## 2024-07-11 PROCEDURE — 83001 ASSAY OF GONADOTROPIN (FSH): CPT

## 2024-07-11 PROCEDURE — G0476 HPV COMBO ASSAY CA SCREEN: HCPCS | Performed by: OBSTETRICS & GYNECOLOGY

## 2024-07-11 PROCEDURE — 99214 OFFICE O/P EST MOD 30 MIN: CPT | Performed by: OBSTETRICS & GYNECOLOGY

## 2024-07-11 PROCEDURE — 86780 TREPONEMA PALLIDUM: CPT

## 2024-07-11 PROCEDURE — 87389 HIV-1 AG W/HIV-1&-2 AB AG IA: CPT

## 2024-07-11 PROCEDURE — 82670 ASSAY OF TOTAL ESTRADIOL: CPT

## 2024-07-11 NOTE — PROGRESS NOTES
Subjective      Brooke Messina is a 34 y.o. female who presents for annual GYN exam.     GYN:  Menses are regular, q 28 days, lasting 1-3 days. She denies intermenstrual bleeding, or spotting.   She denies vaginal discharge, labial erythema or lesions, dyspareunia.  Contraception: None. She has been trying to get pregnant for 2 years intermittently. Her  is in the  so he is not always home. He is gone once a month and then for a few weeks each year.   Patient is sexually active with her .    OB:  OB History    Para Term  AB Living   0 0 0 0 0 0   SAB IAB Ectopic Multiple Live Births   0 0 0 0 0     :  She denies dysuria, urinary urgency.  She denies hematuria, flank pain, incontinence.  She reports urinary frequency especially at night prior to sleep. She reports that she has difficulty falling asleep and therefore will get up to urinate multiple times to ensur that once she falls asleep she is not awoken by the need to pee. Additionally she reports drinking decaf coffee and about 8oz water per day -      Breast:  She denies breast mass, skin changes, dimpling, reddening, nipple retraction.  She denies breast discharge.  She has a history of cystic breasts. She had a left breast cyst that was followed for a while with imaging but then she was cleared.     Cancer-related family history is not on file.    Past Medical History:   Diagnosis Date    Anxiety        Past Surgical History:   Procedure Laterality Date    DENTAL SURGERY      TONSILLECTOMY         General:    Social History     Tobacco Use    Smoking status: Never     Passive exposure: Never    Smokeless tobacco: Never   Vaping Use    Vaping status: Never Used   Substance Use Topics    Alcohol use: No    Drug use: No       Screening:  Cervical cancer: last pap smear in 2024. Results were ASCUS HPV positive. She had a colposcopy with 2 biopsies with Dr. Avis Carpenter that showed acute and chronic endocervicitis.  "Patient has received Gardasil vaccine.   Breast cancer: Not yet indicated  Colon cancer: Not yet indicated  STD screening: None.    Review of Systems   Constitutional:  Negative for appetite change, chills and fever.   HENT:  Negative for trouble swallowing.    Respiratory:  Negative for shortness of breath.    Cardiovascular:  Negative for chest pain.   Gastrointestinal:  Negative for abdominal pain, constipation, diarrhea, nausea and vomiting.   Genitourinary:  Positive for frequency. Negative for decreased urine volume, difficulty urinating, dyspareunia, dysuria, flank pain, genital sores, hematuria, menstrual problem, pelvic pain, urgency, vaginal bleeding, vaginal discharge and vaginal pain.   Psychiatric/Behavioral:  The patient is nervous/anxious.           Objective      /62 (BP Location: Right arm, Patient Position: Sitting, Cuff Size: Standard)   Ht 5' 5\" (1.651 m)   Wt 62.1 kg (137 lb)   LMP 06/22/2024 (Exact Date)   BMI 22.80 kg/m²   Physical Exam  Vitals reviewed.   Constitutional:       General: She is not in acute distress.     Appearance: Normal appearance. She is well-developed. She is not ill-appearing, toxic-appearing or diaphoretic.   Neck:      Thyroid: No thyroid mass, thyromegaly or thyroid tenderness.   Cardiovascular:      Rate and Rhythm: Normal rate and regular rhythm.      Heart sounds: Normal heart sounds. No murmur heard.     No friction rub. No gallop.   Pulmonary:      Effort: Pulmonary effort is normal. No respiratory distress.      Breath sounds: Normal breath sounds. No stridor. No wheezing, rhonchi or rales.   Chest:      Chest wall: No tenderness.   Breasts:     Breasts are symmetrical.      Right: No swelling, bleeding, inverted nipple, mass, nipple discharge, skin change or tenderness.      Left: No swelling, bleeding, inverted nipple, mass, nipple discharge, skin change or tenderness.   Abdominal:      General: There is no distension.      Palpations: Abdomen is " soft.      Tenderness: There is no abdominal tenderness.   Genitourinary:     General: Normal vulva.      Exam position: Lithotomy position.      Labia:         Right: No rash, tenderness, lesion or injury.         Left: No rash, tenderness, lesion or injury.       Urethra: No prolapse or urethral lesion.      Vagina: Normal. No signs of injury and foreign body. No vaginal discharge, erythema, tenderness, bleeding, lesions or prolapsed vaginal walls.      Cervix: No cervical motion tenderness, discharge, friability, lesion, erythema, cervical bleeding or eversion.      Uterus: Not deviated, not enlarged, not fixed, not tender and no uterine prolapse.       Adnexa:         Right: No mass, tenderness or fullness.          Left: No mass, tenderness or fullness.        Rectum: No external hemorrhoid.      Comments: Small nulliparous cervix  Lymphadenopathy:      Cervical: No cervical adenopathy.      Upper Body:      Right upper body: No supraclavicular, axillary or pectoral adenopathy.      Left upper body: No supraclavicular, axillary or pectoral adenopathy.   Skin:     General: Skin is warm and dry.   Neurological:      Mental Status: She is alert and oriented to person, place, and time.   Psychiatric:         Mood and Affect: Mood is anxious. Affect is tearful (When discussing fertility).         Behavior: Behavior normal. Behavior is cooperative.                 Assessment/Plan  Problem List Items Addressed This Visit          Unprioritized    Encounter for preconception consultation     Labs:   Prenatal panel ordered, TSH, AMH, Testosterone ordered (printed separately from timed labs)  Day 3: FSH, LH, estradiol ordered  Day 21: LH, progesterone, estradiol ordered  Semen Analysis: Ordered for  - Gabino Messina  3/27/1989. Order placed in his chart.   Pap: Collected today  Pelvic US: Ordered  Immunity: Rubella ordered; Varicella ordered  PNV: Reviewed need for PNV (recommend 3 months prior to TTC)    Precautions: Aware to avoid tobacco and etoh and drugs when trying to conceive.  Medications: We reviewed pts current medication list- currently on: Albuterol only (ordered but has not used it); She stopped her ADHD medication; She was previously on Xanax but was told to discontinue while attempting pregnancy. Reviewed with patient that she may take her ADHD medication if necessary for appropriate level functioning (reviewed - that limited safety data is available for these medications). We also discussed that if necessary, she may take Xanax PRN during the follicular phase of her cycle. She was unable to tolerate Zoloft (SI).  COVID: Vaccinated  Flu vaccine: Recommend during the seaon  Maintenance: Reviewed healthy diet and exercise  Supplementation: Myoinositol up to 8000mg daily; Coq10 200mg daily (up to 400mg daily)  Conception timing: Reviewed Ovulation timing and recommended intercourse timing  Prenatal care: Call with + UPT         Relevant Orders    CBC and differential    RPR-Syphilis Screening (Total Syphilis IGG/IGM) (Completed)    Hepatitis B surface antigen (Completed)    Hepatitis C antibody    Hepatitis B surface antibody    Human Immunodeficiency Virus 1/2 Antigen / Antibody ( Fourth Generation) with Reflex Testing    HIV 1/2 AG/AB w Reflex [facility] for 2 yr old and above (Completed)    Rubella antibody, IgG    Urinalysis with microscopic    Urine culture    Anemia Panel w/Reflex, OB (Completed)    Varicella zoster antibody, IgG    US pelvis complete w transvaginal    Antimullerian hormone (AMH)    Testosterone    Estradiol (Completed)    Follicle stimulating hormone (Completed)    Luteinizing hormone (Completed)    Luteinizing hormone    Progesterone    Estradiol    Encounter for annual routine gynecological examination - Primary     GYN concerns today: Fertility  Birth control: None  Cervical cancer screening: Collected today  Breast Cancer screening: Due age 40  Colon cancer Screening: Due age  46  STD screening: Ordered today given fertility concerns and need for prenatal testing  Urinary frequency: We reviewed anxiety control and sleep hygiene; Recommended hydration to decrease bladder irritation from urine concentration  Reviewed healthy lifestyle and safe sex practices  RTO for annual exam or PRN         Relevant Orders    Liquid-based pap, screening    HPV High Risk (Completed)     I have spent a total time of 48 minutes in caring for this patient on the day of the visit/encounter including Risks and benefits of tx options, Instructions for management, Patient and family education, Risk factor reductions, Counseling / Coordination of care, Reviewing / ordering tests, medicine, procedures  , and Obtaining or reviewing history  . On 7/12/24, an additional 16 minutes was spent documenting in the medical record.     Pooja Bowles MD  OB/GYN  7/12/2024  7:56 PM

## 2024-07-12 PROBLEM — Z31.69 ENCOUNTER FOR PRECONCEPTION CONSULTATION: Status: ACTIVE | Noted: 2024-07-12

## 2024-07-12 PROBLEM — Z01.419 ENCOUNTER FOR ANNUAL ROUTINE GYNECOLOGICAL EXAMINATION: Status: ACTIVE | Noted: 2024-07-12

## 2024-07-12 LAB
HBV SURFACE AG SER QL: NORMAL
HIV 1+2 AB+HIV1 P24 AG SERPL QL IA: NORMAL
HIV 2 AB SERPL QL IA: NORMAL
HIV1 AB SERPL QL IA: NORMAL
HIV1 P24 AG SERPL QL IA: NORMAL
HPV HR 12 DNA CVX QL NAA+PROBE: NEGATIVE
HPV16 DNA CVX QL NAA+PROBE: NEGATIVE
HPV18 DNA CVX QL NAA+PROBE: NEGATIVE
TREPONEMA PALLIDUM IGG+IGM AB [PRESENCE] IN SERUM OR PLASMA BY IMMUNOASSAY: NORMAL

## 2024-07-12 NOTE — ASSESSMENT & PLAN NOTE
Labs:   Prenatal panel ordered, TSH, AMH, Testosterone ordered (printed separately from timed labs)  Day 3: FSH, LH, estradiol ordered  Day 21: LH, progesterone, estradiol ordered  Semen Analysis: Ordered for  - Gabino Messina  3/27/1989. Order placed in his chart.   Pap: Collected today  Pelvic US: Ordered  Immunity: Rubella ordered; Varicella ordered  PNV: Reviewed need for PNV (recommend 3 months prior to TTC)   Precautions: Aware to avoid tobacco and etoh and drugs when trying to conceive.  Medications: We reviewed pts current medication list- currently on: Albuterol only (ordered but has not used it); She stopped her ADHD medication; She was previously on Xanax but was told to discontinue while attempting pregnancy. Reviewed with patient that she may take her ADHD medication if necessary for appropriate level functioning (reviewed - that limited safety data is available for these medications). We also discussed that if necessary, she may take Xanax PRN during the follicular phase of her cycle. She was unable to tolerate Zoloft (SI).  COVID: Vaccinated  Flu vaccine: Recommend during the seaon  Maintenance: Reviewed healthy diet and exercise  Supplementation: Myoinositol up to 8000mg daily; Coq10 200mg daily (up to 400mg daily)  Conception timing: Reviewed Ovulation timing and recommended intercourse timing  Prenatal care: Call with + UPT

## 2024-07-12 NOTE — ASSESSMENT & PLAN NOTE
GYN concerns today: Fertility  Birth control: None  Cervical cancer screening: Collected today  Breast Cancer screening: Due age 41  Colon cancer Screening: Due age 46  STD screening: Ordered today given fertility concerns and need for prenatal testing  Urinary frequency: We reviewed anxiety control and sleep hygiene; Recommended hydration to decrease bladder irritation from urine concentration  Reviewed healthy lifestyle and safe sex practices  RTO for annual exam or PRN

## 2024-07-17 LAB
LAB AP GYN PRIMARY INTERPRETATION: NORMAL
LAB AP LMP: NORMAL
Lab: NORMAL

## 2024-07-25 ENCOUNTER — TELEPHONE (OUTPATIENT)
Age: 34
End: 2024-07-25

## 2024-07-25 NOTE — TELEPHONE ENCOUNTER
Patient called to schedule a virtual visit for pink eye.  Only opening I could find was with Dr. Lombardi and it was in the office, patient refused and said she would call Doyle and schedule a virtual visit.

## 2024-07-30 ENCOUNTER — TELEPHONE (OUTPATIENT)
Dept: OBGYN CLINIC | Facility: CLINIC | Age: 34
End: 2024-07-30

## 2024-07-30 ENCOUNTER — HOSPITAL ENCOUNTER (OUTPATIENT)
Dept: RADIOLOGY | Facility: HOSPITAL | Age: 34
Discharge: HOME | End: 2024-07-30
Attending: OBSTETRICS & GYNECOLOGY
Payer: COMMERCIAL

## 2024-07-30 DIAGNOSIS — Z31.69 ENCOUNTER FOR PRECONCEPTION CONSULTATION: ICD-10-CM

## 2024-07-30 PROCEDURE — 76830 TRANSVAGINAL US NON-OB: CPT

## 2024-07-30 PROCEDURE — 76856 US EXAM PELVIC COMPLETE: CPT

## 2024-07-30 NOTE — TELEPHONE ENCOUNTER
Called [facility] and spoke with Briana to follow up about labs that were drawn in error.  I had spoken with them about a week and a half ago to let them know certain labs (  FSH, LH, progesterone, and Estradiol) were not supposed to be drawn.  She is supposed to do those on day 3 and 21 of her cycle.  She states she told the lab that when she went there, but labs were still drawn anyway.  As per Briana her supervisor is still waiting to hear back from the head of outpatient services.  Will wait to hear back

## 2024-08-01 ENCOUNTER — OFFICE VISIT (OUTPATIENT)
Dept: FAMILY MEDICINE CLINIC | Facility: CLINIC | Age: 34
End: 2024-08-01
Payer: COMMERCIAL

## 2024-08-01 VITALS
TEMPERATURE: 95.1 F | DIASTOLIC BLOOD PRESSURE: 60 MMHG | SYSTOLIC BLOOD PRESSURE: 94 MMHG | HEIGHT: 65 IN | WEIGHT: 137 LBS | HEART RATE: 72 BPM | BODY MASS INDEX: 22.82 KG/M2 | RESPIRATION RATE: 16 BRPM

## 2024-08-01 DIAGNOSIS — J02.9 ACUTE PHARYNGITIS, UNSPECIFIED ETIOLOGY: Primary | ICD-10-CM

## 2024-08-01 LAB — S PYO AG THROAT QL: NEGATIVE

## 2024-08-01 PROCEDURE — 99213 OFFICE O/P EST LOW 20 MIN: CPT | Performed by: NURSE PRACTITIONER

## 2024-08-01 PROCEDURE — 87880 STREP A ASSAY W/OPTIC: CPT | Performed by: NURSE PRACTITIONER

## 2024-08-01 NOTE — PROGRESS NOTES
"Ambulatory Visit  Name: Brooke Messina      : 1990      MRN: [id number]  Encounter Provider: CHARLES Isabel  Encounter Date: 2024   Encounter department: [facility]      Patient requesting send out given exposure.  In the meantime, recommended Motrin as needed, salt water gargles, throat lozenges, vitamin C/zinc.  Will follow-up with results of culture.    Assessment & Plan   1. Acute pharyngitis, unspecified etiology  -     Throat culture  -     POCT rapid ANTIGEN strepA    Recent Results (from the past 24 hour(s))   POCT rapid ANTIGEN strepA    Collection Time: 24 10:04 AM   Result Value Ref Range     RAPID STREP A Negative Negative          History of Present Illness     Here today with complaints of sore throat, body aches, and fatigue for the past week or so.  She has not had any fevers, congestion or cough.  Reports a lot of her coworkers have been positive for strep on send out tests and she would like to be checked.  She is status post tonsillectomy        Review of Systems   Constitutional:  Positive for fatigue. Negative for chills and fever.   HENT:  Positive for sore throat. Negative for congestion, ear pain, postnasal drip, rhinorrhea and sinus pressure.    Respiratory:  Negative for cough, shortness of breath and wheezing.    Cardiovascular:  Negative for chest pain.   Gastrointestinal:  Negative for abdominal pain, diarrhea, nausea and vomiting.   Musculoskeletal:  Positive for arthralgias.   Skin:  Negative for rash.   Neurological:  Negative for headaches.       Objective     BP 94/60   Pulse 72   Temp (!) 95.1 °F (35.1 °C)   Resp 16   Ht 5' 5\" (1.651 m)   Wt 62.1 kg (137 lb)   LMP 2024 (Exact Date)   BMI 22.80 kg/m²     Physical Exam  Vitals and nursing note reviewed.   Constitutional:       General: She is not in acute distress.     Appearance: Normal appearance.   HENT:      Head: Normocephalic and atraumatic.      Mouth/Throat:      " Pharynx: No oropharyngeal exudate or posterior oropharyngeal erythema.      Comments: Tonsils absent   Eyes:      Conjunctiva/sclera: Conjunctivae normal.   Neck:      Vascular: No carotid bruit.   Cardiovascular:      Rate and Rhythm: Normal rate and regular rhythm.      Pulses: Normal pulses.      Heart sounds: Normal heart sounds. No murmur heard.  Pulmonary:      Effort: Pulmonary effort is normal.      Breath sounds: Normal breath sounds.   Skin:     General: Skin is warm and dry.   Neurological:      Mental Status: She is alert.   Psychiatric:         Mood and Affect: Mood normal.         Behavior: Behavior normal.       Administrative Statements

## 2024-08-03 ENCOUNTER — PATIENT MESSAGE (OUTPATIENT)
Dept: FAMILY MEDICINE CLINIC | Facility: CLINIC | Age: 34
End: 2024-08-03

## 2024-08-03 DIAGNOSIS — J02.9 ACUTE PHARYNGITIS, UNSPECIFIED ETIOLOGY: Primary | ICD-10-CM

## 2024-08-04 LAB — B-HEM STREP SPEC QL CULT: NEGATIVE

## 2024-08-06 RX ORDER — AMOXICILLIN 875 MG/1
875 TABLET, COATED ORAL 2 TIMES DAILY
Qty: 20 TABLET | Refills: 0 | Status: SHIPPED | OUTPATIENT
Start: 2024-08-06 | End: 2024-08-16

## 2024-08-06 NOTE — TELEPHONE ENCOUNTER
Spoke with Luba today and states she has been trying several times to reach out to the director of out patient services and has not got any response as of yet.  She is going to try to reach out to them again today and will call me back with status.  Also called the pt to let her know that we are working on this for her.

## 2024-08-11 PROBLEM — Z01.419 ENCOUNTER FOR ANNUAL ROUTINE GYNECOLOGICAL EXAMINATION: Status: RESOLVED | Noted: 2024-07-12 | Resolved: 2024-08-11

## 2024-08-22 ENCOUNTER — TELEPHONE (OUTPATIENT)
Age: 34
End: 2024-08-22

## 2024-08-22 NOTE — TELEPHONE ENCOUNTER
The patient called she would like an appointment with Dr Aleksandr santana  she would even do a virtual call   the patient sees Dr Brandon , Dr Brandon took the patient off her medications that were prescribed by her previous pcp and put her on another antidepressant   the patient did not do well and she was told to discontinue the medication   the patient is currently not taking anything and she feels she is not doing very well   please call her to set up an appointment asap      thank you

## 2024-08-27 ENCOUNTER — OFFICE VISIT (OUTPATIENT)
Dept: FAMILY MEDICINE CLINIC | Facility: CLINIC | Age: 34
End: 2024-08-27
Payer: COMMERCIAL

## 2024-08-27 ENCOUNTER — TELEPHONE (OUTPATIENT)
Age: 34
End: 2024-08-27

## 2024-08-27 VITALS
OXYGEN SATURATION: 98 % | DIASTOLIC BLOOD PRESSURE: 70 MMHG | SYSTOLIC BLOOD PRESSURE: 110 MMHG | TEMPERATURE: 97.4 F | WEIGHT: 136 LBS | HEART RATE: 65 BPM | BODY MASS INDEX: 22.66 KG/M2 | HEIGHT: 65 IN | RESPIRATION RATE: 18 BRPM

## 2024-08-27 DIAGNOSIS — F41.9 ANXIETY: Primary | ICD-10-CM

## 2024-08-27 PROCEDURE — 99214 OFFICE O/P EST MOD 30 MIN: CPT | Performed by: FAMILY MEDICINE

## 2024-08-27 RX ORDER — BUPROPION HYDROCHLORIDE 150 MG/1
150 TABLET ORAL DAILY
Qty: 30 TABLET | Refills: 0 | Status: SHIPPED | OUTPATIENT
Start: 2024-08-27

## 2024-08-27 NOTE — TELEPHONE ENCOUNTER
Contacted patient in regards to Routine Referral in attempts to verify patient's needs of services and add patient to proper wait list. Pt answered phone and ended it as soon as IC greeted her.     Attempt #1

## 2024-08-27 NOTE — PROGRESS NOTES
Ambulatory Visit  Name: Brooke Messina      : 1990      MRN: [id number]  Encounter Provider: Sandy Crews MD  Encounter Date: 2024   Encounter department: [facility]    Assessment & Plan   1. Anxiety  -     Ambulatory referral to Psych Services; Future  -     buPROPion (Wellbutrin XL) 150 mg 24 hr tablet; Take 1 tablet (150 mg total) by mouth daily     She was previously on xanax for anxiety, but this was stopped due to her planning to get pregnant. She tried zoloft, but developed significant depression/suicidal thoughts while on it so would like to avoid SSRIs moving forward. Will trial Wellbutrin. Her mother has been on Wellbutrin herself in the past and did well with it. Side effects reviewed. Follow up in 1 month.   History of Present Illness     HPI  Brooke is here today for follow up of uncontrolled anxiety.   PHQ-2/9 Depression Screening    Little interest or pleasure in doing things: 0 - not at all  Feeling down, depressed, or hopeless: 0 - not at all  PHQ-2 Score: 0  PHQ-2 Interpretation: Negative depression screen        HOWIE-7 Flowsheet Screening      Flowsheet Row Most Recent Value   Over the last two weeks, how often have you been bothered by the following problems?     Feeling nervous, anxious, or on edge 3   Not being able to stop or control worrying 3   Worrying too much about different things 3   Trouble relaxing  3   Being so restless that it's hard to sit still 2   Becoming easily annoyed or irritable  3   Feeling afraid as if something awful might happen 0   How difficult have these problems made it for you to do your work, take care of things at home, or get along with other people?  Very difficult   HOWIE Score  17           Review of Systems   Constitutional: Negative.    HENT: Negative.     Eyes: Negative.    Respiratory: Negative.     Cardiovascular: Negative.    Gastrointestinal: Negative.    Endocrine: Negative.    Genitourinary: Negative.    Musculoskeletal:  "Negative.    Skin: Negative.    Allergic/Immunologic: Negative.    Neurological: Negative.    Hematological: Negative.    Psychiatric/Behavioral:  The patient is nervous/anxious.        Objective     /70   Pulse 65   Temp (!) 97.4 °F (36.3 °C)   Resp 18   Ht 5' 5\" (1.651 m)   Wt 61.7 kg (136 lb)   LMP 08/17/2024 (Exact Date)   SpO2 98%   BMI 22.63 kg/m²     Physical Exam  Constitutional:       General: She is not in acute distress.     Appearance: Normal appearance. She is not ill-appearing, toxic-appearing or diaphoretic.   HENT:      Head: Normocephalic and atraumatic.   Eyes:      General:         Right eye: No discharge.         Left eye: No discharge.      Conjunctiva/sclera: Conjunctivae normal.   Pulmonary:      Effort: Pulmonary effort is normal.   Neurological:      Mental Status: She is alert.   Psychiatric:         Mood and Affect: Mood normal.         Behavior: Behavior normal.         Thought Content: Thought content normal.         Judgment: Judgment normal.       Administrative Statements     "

## 2024-08-30 NOTE — TELEPHONE ENCOUNTER
The writer attempted to contact the patient to verify the need for services. The writer LVM for the patient to contact the office for assistance with being placed on the proper wait list.     2nd attempt

## 2024-09-06 ENCOUNTER — TELEPHONE (OUTPATIENT)
Age: 34
End: 2024-09-06

## 2024-09-06 DIAGNOSIS — Z31.69 ENCOUNTER FOR PRECONCEPTION CONSULTATION: Primary | ICD-10-CM

## 2024-09-06 NOTE — TELEPHONE ENCOUNTER
Called Pt in regards to referral received, in attempts to verify needs of services and advised of current wait list. No answer, lvm for patient to call back at [phone].    3rd attempt. Closing referral.

## 2024-09-06 NOTE — TELEPHONE ENCOUNTER
Patient was at the lab to get her cycle day 21 labs done but realized it was a day early she is planning on going back tomorrow. Patient asked for the labs to be reordered because they were drawn in error already. I had spoken to office clinical who were going to get her cycle day 3 and cycle day 21 labs reordered. Patient made aware and she will have cycle day 21 labs drawn tomorrow.

## 2024-09-06 NOTE — PROGRESS NOTES
Pt called and stated she needs BW script again, drawn the wrong date accidentally by the lab. Labs submitted,

## 2024-09-07 ENCOUNTER — APPOINTMENT (OUTPATIENT)
Dept: LAB | Facility: HOSPITAL | Age: 34
End: 2024-09-07
Attending: OBSTETRICS & GYNECOLOGY
Payer: COMMERCIAL

## 2024-09-07 DIAGNOSIS — Z31.69 ENCOUNTER FOR PRECONCEPTION CONSULTATION: ICD-10-CM

## 2024-09-07 LAB
ESTRADIOL SERPL-MCNC: 246.3 PG/ML
FSH SERPL-ACNC: 2.8 MIU/ML
LH SERPL-ACNC: 4.3 MIU/ML

## 2024-09-07 PROCEDURE — 36415 COLL VENOUS BLD VENIPUNCTURE: CPT

## 2024-09-07 PROCEDURE — 83001 ASSAY OF GONADOTROPIN (FSH): CPT

## 2024-09-07 PROCEDURE — 83002 ASSAY OF GONADOTROPIN (LH): CPT

## 2024-09-07 PROCEDURE — 82670 ASSAY OF TOTAL ESTRADIOL: CPT

## 2024-09-16 ENCOUNTER — APPOINTMENT (OUTPATIENT)
Dept: LAB | Facility: HOSPITAL | Age: 34
End: 2024-09-16
Attending: OBSTETRICS & GYNECOLOGY
Payer: COMMERCIAL

## 2024-09-16 ENCOUNTER — NURSE TRIAGE (OUTPATIENT)
Age: 34
End: 2024-09-16

## 2024-09-16 DIAGNOSIS — Z31.69 ENCOUNTER FOR PRECONCEPTION CONSULTATION: ICD-10-CM

## 2024-09-16 DIAGNOSIS — Z31.69 ENCOUNTER FOR PRECONCEPTION CONSULTATION: Primary | ICD-10-CM

## 2024-09-16 LAB
ESTRADIOL SERPL-MCNC: 35.2 PG/ML
FSH SERPL-ACNC: 5.9 MIU/ML
LH SERPL-ACNC: 4.9 MIU/ML

## 2024-09-16 PROCEDURE — 36415 COLL VENOUS BLD VENIPUNCTURE: CPT

## 2024-09-16 PROCEDURE — 82670 ASSAY OF TOTAL ESTRADIOL: CPT

## 2024-09-16 PROCEDURE — 83001 ASSAY OF GONADOTROPIN (FSH): CPT

## 2024-09-16 PROCEDURE — 83002 ASSAY OF GONADOTROPIN (LH): CPT

## 2024-09-16 NOTE — TELEPHONE ENCOUNTER
Prior to calling patient can I confirm what labs she is getting done for cycle day 3 (which is today)    Is she getting LH, FSH, and estradiol again?

## 2024-09-16 NOTE — TELEPHONE ENCOUNTER
Patient is inquiring about a bill she received on my chart of 230.00 for lab work. She states she has already had the tests. Please advise and call her.

## 2024-09-16 NOTE — TELEPHONE ENCOUNTER
Regarding: labs that need to be done on day 3 of cycler-today  ----- Message from Lora STUBBS sent at 9/16/2024  9:37 AM EDT -----  Patient calling to have new labs ordered since she is on day 3 of her cycle and needs her blood work to be drawn today. She had called in previously regarding labs to be re-ordered since they were all drawn at once at the lab.       Tried CTS  [facility]

## 2024-09-18 DIAGNOSIS — F41.9 ANXIETY: ICD-10-CM

## 2024-09-19 DIAGNOSIS — F41.9 ANXIETY: ICD-10-CM

## 2024-09-19 RX ORDER — BUPROPION HYDROCHLORIDE 150 MG/1
150 TABLET ORAL DAILY
Qty: 30 TABLET | Refills: 0 | Status: SHIPPED | OUTPATIENT
Start: 2024-09-19

## 2024-09-19 RX ORDER — BUPROPION HYDROCHLORIDE 150 MG/1
150 TABLET ORAL DAILY
Qty: 90 TABLET | Refills: 1 | Status: SHIPPED | OUTPATIENT
Start: 2024-09-19

## 2024-10-10 ENCOUNTER — OFFICE VISIT (OUTPATIENT)
Dept: FAMILY MEDICINE CLINIC | Facility: CLINIC | Age: 34
End: 2024-10-10
Payer: COMMERCIAL

## 2024-10-10 VITALS
HEIGHT: 65 IN | TEMPERATURE: 98.2 F | DIASTOLIC BLOOD PRESSURE: 78 MMHG | WEIGHT: 137.2 LBS | RESPIRATION RATE: 17 BRPM | BODY MASS INDEX: 22.86 KG/M2 | SYSTOLIC BLOOD PRESSURE: 122 MMHG | HEART RATE: 72 BPM

## 2024-10-10 DIAGNOSIS — F41.9 ANXIETY: Primary | ICD-10-CM

## 2024-10-10 PROCEDURE — 99213 OFFICE O/P EST LOW 20 MIN: CPT | Performed by: FAMILY MEDICINE

## 2024-10-10 NOTE — ASSESSMENT & PLAN NOTE
Some improvement with Wellbutrin, however she has noticed agitation at night and is worried that her diastolic BP is slightly high. I did assure her that this is not at a concerning level. I also offered hydroxyzine at bedtime to help with agitation.   For now she will continue Wellbutrin.

## 2024-10-10 NOTE — PROGRESS NOTES
"Ambulatory Visit  Name: Brooke Messina      : 1990      MRN: [id number]  Encounter Provider: Sandy Crews MD  Encounter Date: 10/10/2024   Encounter department: [facility]    Assessment & Plan  Anxiety  Some improvement with Wellbutrin, however she has noticed agitation at night and is worried that her diastolic BP is slightly high. I did assure her that this is not at a concerning level. I also offered hydroxyzine at bedtime to help with agitation.   For now she will continue Wellbutrin.           History of Present Illness     HPI  Brooke is here today for follow up of anxiety. She was initially on zoloft, but stopped due to side effects. Was then switched to wellbutrin. Today states since starting wellbutrin, she has had some trouble sleeping.   Feels better during the day, but at night is more agitated.     Review of Systems   Constitutional: Negative.    HENT: Negative.     Eyes: Negative.    Respiratory: Negative.     Cardiovascular: Negative.    Gastrointestinal: Negative.    Endocrine: Negative.    Genitourinary: Negative.    Musculoskeletal: Negative.    Skin: Negative.    Allergic/Immunologic: Negative.    Neurological: Negative.    Hematological: Negative.    Psychiatric/Behavioral: Negative.             Objective     /78   Pulse 72   Temp 98.2 °F (36.8 °C)   Resp 17   Ht 5' 5\" (1.651 m)   Wt 62.2 kg (137 lb 3.2 oz)   LMP 2024 (Exact Date)   BMI 22.83 kg/m²     Physical Exam  Constitutional:       General: She is not in acute distress.     Appearance: Normal appearance. She is not ill-appearing, toxic-appearing or diaphoretic.   HENT:      Head: Normocephalic and atraumatic.   Eyes:      General:         Right eye: No discharge.         Left eye: No discharge.      Conjunctiva/sclera: Conjunctivae normal.   Pulmonary:      Effort: Pulmonary effort is normal.   Neurological:      Mental Status: She is alert.   Psychiatric:         Mood and Affect: Mood normal.    "      Behavior: Behavior normal.         Thought Content: Thought content normal.         Judgment: Judgment normal.

## 2024-10-29 ENCOUNTER — TELEPHONE (OUTPATIENT)
Age: 34
End: 2024-10-29

## 2024-10-29 NOTE — TELEPHONE ENCOUNTER
Patient called requesting refill for Wellbutrin XL . Patient made aware medication was refilled on 9/19/24 for 90 with 1 refills to [facility]. Patient instructed to contact the pharmacy to obtain refills of medication. Patient verbalized understanding.

## 2024-11-07 DIAGNOSIS — F41.9 ANXIETY: ICD-10-CM

## 2024-11-07 RX ORDER — BUPROPION HYDROCHLORIDE 150 MG/1
150 TABLET ORAL DAILY
Qty: 90 TABLET | Refills: 1 | Status: SHIPPED | OUTPATIENT
Start: 2024-11-07

## 2024-11-07 NOTE — TELEPHONE ENCOUNTER
Patient is at the pharmacy . They do not have the 90 day Bupropion 150 mg that was sent 9/19 at 1:15 , only received the 30 day one that in her chart was canceled, at 9:56 am. Please send to pharmacy asap. Patient is out

## 2024-12-30 DIAGNOSIS — F41.9 ANXIETY: ICD-10-CM

## 2024-12-31 RX ORDER — BUPROPION HYDROCHLORIDE 150 MG/1
150 TABLET ORAL DAILY
Qty: 90 TABLET | Refills: 1 | Status: SHIPPED | OUTPATIENT
Start: 2024-12-31

## 2025-01-10 ENCOUNTER — OFFICE VISIT (OUTPATIENT)
Dept: FAMILY MEDICINE CLINIC | Facility: CLINIC | Age: 35
End: 2025-01-10
Payer: COMMERCIAL

## 2025-01-10 VITALS
BODY MASS INDEX: 22.26 KG/M2 | HEIGHT: 65 IN | TEMPERATURE: 98.5 F | SYSTOLIC BLOOD PRESSURE: 122 MMHG | HEART RATE: 84 BPM | WEIGHT: 133.6 LBS | RESPIRATION RATE: 17 BRPM | DIASTOLIC BLOOD PRESSURE: 80 MMHG

## 2025-01-10 DIAGNOSIS — F41.9 ANXIETY: ICD-10-CM

## 2025-01-10 DIAGNOSIS — R17 ELEVATED BILIRUBIN: ICD-10-CM

## 2025-01-10 DIAGNOSIS — J06.9 UPPER RESPIRATORY TRACT INFECTION, UNSPECIFIED TYPE: ICD-10-CM

## 2025-01-10 DIAGNOSIS — Z13.6 SCREENING FOR CARDIOVASCULAR, RESPIRATORY, AND GENITOURINARY DISEASES: ICD-10-CM

## 2025-01-10 DIAGNOSIS — Z13.89 SCREENING FOR CARDIOVASCULAR, RESPIRATORY, AND GENITOURINARY DISEASES: ICD-10-CM

## 2025-01-10 DIAGNOSIS — R53.83 OTHER FATIGUE: ICD-10-CM

## 2025-01-10 DIAGNOSIS — Z23 ENCOUNTER FOR IMMUNIZATION: ICD-10-CM

## 2025-01-10 DIAGNOSIS — Z11.1 SCREENING FOR TUBERCULOSIS: ICD-10-CM

## 2025-01-10 DIAGNOSIS — Z00.00 WELL ADULT EXAM: Primary | ICD-10-CM

## 2025-01-10 DIAGNOSIS — Z13.83 SCREENING FOR CARDIOVASCULAR, RESPIRATORY, AND GENITOURINARY DISEASES: ICD-10-CM

## 2025-01-10 PROCEDURE — 90656 IIV3 VACC NO PRSV 0.5 ML IM: CPT

## 2025-01-10 PROCEDURE — 86580 TB INTRADERMAL TEST: CPT

## 2025-01-10 PROCEDURE — 90471 IMMUNIZATION ADMIN: CPT

## 2025-01-10 PROCEDURE — 99395 PREV VISIT EST AGE 18-39: CPT | Performed by: FAMILY MEDICINE

## 2025-01-10 RX ORDER — HYDROXYZINE HYDROCHLORIDE 25 MG/1
25 TABLET, FILM COATED ORAL EVERY 6 HOURS PRN
Qty: 30 TABLET | Refills: 0 | Status: SHIPPED | OUTPATIENT
Start: 2025-01-10

## 2025-01-10 RX ORDER — ALBUTEROL SULFATE 90 UG/1
2 INHALANT RESPIRATORY (INHALATION) EVERY 4 HOURS PRN
Qty: 6.7 G | Refills: 0 | Status: SHIPPED | OUTPATIENT
Start: 2025-01-10

## 2025-01-10 NOTE — PROGRESS NOTES
"Adult Annual Physical  Name: Brooke Messina      : 1990      MRN: [id number]  Encounter Provider: Sandy Crews MD  Encounter Date: 1/10/2025   Encounter department: [facility]    Assessment & Plan  Well adult exam         Elevated bilirubin    Orders:    Comprehensive metabolic panel; Future    Other fatigue    Orders:    CBC and differential; Future    TSH, 3rd generation with Free T4 reflex; Future    Vitamin D 25 hydroxy; Future    Vitamin B12; Future    Screening for cardiovascular, respiratory, and genitourinary diseases    Orders:    Lipid Panel with Direct LDL reflex; Future    Anxiety    Orders:    hydrOXYzine HCL (ATARAX) 25 mg tablet; Take 1 tablet (25 mg total) by mouth every 6 (six) hours as needed for anxiety    Immunizations and preventive care screenings were discussed with patient today. Appropriate education was printed on patient's after visit summary.           History of Present Illness     Adult Annual Physical:  Patient presents for annual physical.     Diet and Physical Activity:  - Diet/Nutrition: well balanced diet.  - Exercise: 3-4 times a week on average.    General Health:  - Sleep: 4-6 hours of sleep on average.  - Hearing: normal hearing bilateral ears.  - Vision: vision problems and most recent eye exam > 1 year ago.  - Dental: regular dental visits.    /GYN Health:  - Follows with GYN: yes.     Review of Systems   Constitutional: Negative.    HENT: Negative.     Eyes: Negative.    Respiratory: Negative.     Cardiovascular: Negative.    Gastrointestinal: Negative.    Endocrine: Negative.    Genitourinary: Negative.    Musculoskeletal: Negative.    Skin: Negative.    Allergic/Immunologic: Negative.    Neurological: Negative.    Hematological: Negative.    Psychiatric/Behavioral: Negative.           Objective   /80   Pulse 84   Temp 98.5 °F (36.9 °C)   Resp 17   Ht 5' 5\" (1.651 m) Comment: patient reported  Wt 60.6 kg (133 lb 9.6 oz)   LMP 2025 " (Exact Date)   BMI 22.23 kg/m²     Physical Exam  Vitals and nursing note reviewed.   Constitutional:       General: She is not in acute distress.     Appearance: She is well-developed.   HENT:      Head: Normocephalic and atraumatic.      Right Ear: Tympanic membrane, ear canal and external ear normal. There is no impacted cerumen.      Left Ear: Tympanic membrane, ear canal and external ear normal. There is no impacted cerumen.      Nose: Nose normal.      Mouth/Throat:      Mouth: Mucous membranes are moist.   Eyes:      Conjunctiva/sclera: Conjunctivae normal.   Cardiovascular:      Rate and Rhythm: Normal rate and regular rhythm.      Heart sounds: No murmur heard.  Pulmonary:      Effort: Pulmonary effort is normal. No respiratory distress.      Breath sounds: Normal breath sounds.   Abdominal:      General: Abdomen is flat. There is no distension.      Palpations: Abdomen is soft. There is no mass.      Tenderness: There is no abdominal tenderness. There is no guarding or rebound.      Hernia: No hernia is present.   Musculoskeletal:         General: Normal range of motion.      Cervical back: Neck supple.   Skin:     General: Skin is warm and dry.   Neurological:      General: No focal deficit present.      Mental Status: She is alert and oriented to person, place, and time.

## 2025-01-13 ENCOUNTER — CLINICAL SUPPORT (OUTPATIENT)
Dept: FAMILY MEDICINE CLINIC | Facility: CLINIC | Age: 35
End: 2025-01-13

## 2025-01-13 DIAGNOSIS — Z11.1 ENCOUNTER FOR PPD SKIN TEST READING: Primary | ICD-10-CM

## 2025-02-14 DIAGNOSIS — J06.9 UPPER RESPIRATORY TRACT INFECTION, UNSPECIFIED TYPE: ICD-10-CM

## 2025-02-17 RX ORDER — ALBUTEROL SULFATE 90 UG/1
2 INHALANT RESPIRATORY (INHALATION) EVERY 4 HOURS PRN
Qty: 6.7 G | Refills: 0 | Status: SHIPPED | OUTPATIENT
Start: 2025-02-17

## 2025-02-28 ENCOUNTER — TELEPHONE (OUTPATIENT)
Age: 35
End: 2025-02-28

## 2025-02-28 NOTE — TELEPHONE ENCOUNTER
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 Pt states that she has just got blood work done on Monday and will need to get blood work done again on Friday at her fertility appt. Pt will also check if clinic is able to do that. Pt goes to [facility] in [place]. Fax is [phone].

## 2025-02-28 NOTE — TELEPHONE ENCOUNTER
I spoke to the patient and informed her, she asked if they are able to see the orders in the computer. She will call them to ask, and if not she will call us back so we can fax them. No further action.    Flora Gilbert LPN

## 2025-02-28 NOTE — TELEPHONE ENCOUNTER
She would have to check with the clinic about that. I am unsure if they do outside blood work there.

## 2025-04-15 ENCOUNTER — RESULTS FOLLOW-UP (OUTPATIENT)
Dept: FAMILY MEDICINE CLINIC | Facility: CLINIC | Age: 35
End: 2025-04-15

## 2025-04-15 LAB
25(OH)D3+25(OH)D2 SERPL-MCNC: 38 NG/ML (ref 30–100)
ALBUMIN SERPL-MCNC: 4.6 G/DL (ref 3.9–4.9)
ALP SERPL-CCNC: 55 IU/L (ref 44–121)
ALT SERPL-CCNC: 15 IU/L (ref 0–32)
AST SERPL-CCNC: 20 IU/L (ref 0–40)
BASOPHILS # BLD AUTO: 0 X10E3/UL (ref 0–0.2)
BASOPHILS NFR BLD AUTO: 1 %
BILIRUB SERPL-MCNC: 1 MG/DL (ref 0–1.2)
BUN SERPL-MCNC: 11 MG/DL (ref 6–20)
BUN/CREAT SERPL: 15 (ref 9–23)
CALCIUM SERPL-MCNC: 9.5 MG/DL (ref 8.7–10.2)
CHLORIDE SERPL-SCNC: 102 MMOL/L (ref 96–106)
CHOLEST SERPL-MCNC: 113 MG/DL (ref 100–199)
CO2 SERPL-SCNC: 19 MMOL/L (ref 20–29)
CREAT SERPL-MCNC: 0.72 MG/DL (ref 0.57–1)
EGFR: 113 ML/MIN/1.73
EOSINOPHIL # BLD AUTO: 0.1 X10E3/UL (ref 0–0.4)
EOSINOPHIL NFR BLD AUTO: 1 %
ERYTHROCYTE [DISTWIDTH] IN BLOOD BY AUTOMATED COUNT: 11.7 % (ref 11.7–15.4)
GLOBULIN SER-MCNC: 2.8 G/DL (ref 1.5–4.5)
GLUCOSE SERPL-MCNC: 86 MG/DL (ref 70–99)
HCT VFR BLD AUTO: 41 % (ref 34–46.6)
HDLC SERPL-MCNC: 52 MG/DL
HGB BLD-MCNC: 14.2 G/DL (ref 11.1–15.9)
IMM GRANULOCYTES # BLD: 0 X10E3/UL (ref 0–0.1)
IMM GRANULOCYTES NFR BLD: 0 %
LDLC SERPL CALC-MCNC: 49 MG/DL (ref 0–99)
LDLC/HDLC SERPL: 0.9 RATIO (ref 0–3.2)
LYMPHOCYTES # BLD AUTO: 1.9 X10E3/UL (ref 0.7–3.1)
LYMPHOCYTES NFR BLD AUTO: 33 %
MCH RBC QN AUTO: 31.8 PG (ref 26.6–33)
MCHC RBC AUTO-ENTMCNC: 34.6 G/DL (ref 31.5–35.7)
MCV RBC AUTO: 92 FL (ref 79–97)
MICRODELETION SYND BLD/T FISH: NORMAL
MONOCYTES # BLD AUTO: 0.6 X10E3/UL (ref 0.1–0.9)
MONOCYTES NFR BLD AUTO: 11 %
NEUTROPHILS # BLD AUTO: 3.1 X10E3/UL (ref 1.4–7)
NEUTROPHILS NFR BLD AUTO: 54 %
PLATELET # BLD AUTO: 288 X10E3/UL (ref 150–450)
POTASSIUM SERPL-SCNC: 4.2 MMOL/L (ref 3.5–5.2)
PROT SERPL-MCNC: 7.4 G/DL (ref 6–8.5)
RBC # BLD AUTO: 4.46 X10E6/UL (ref 3.77–5.28)
SL AMB VLDL CHOLESTEROL CALC: 12 MG/DL (ref 5–40)
SODIUM SERPL-SCNC: 136 MMOL/L (ref 134–144)
TRIGL SERPL-MCNC: 50 MG/DL (ref 0–149)
TSH SERPL DL<=0.005 MIU/L-ACNC: 1.64 UIU/ML (ref 0.45–4.5)
VIT B12 SERPL-MCNC: 429 PG/ML (ref 232–1245)
WBC # BLD AUTO: 5.6 X10E3/UL (ref 3.4–10.8)

## 2025-05-07 ENCOUNTER — TELEPHONE (OUTPATIENT)
Age: 35
End: 2025-05-07

## 2025-05-07 NOTE — TELEPHONE ENCOUNTER
"Patient called in to follow up on physical form completed 1/13/25 ([employer]) post physical OV 1/10/25 since her future employer advised they could not proceed with her application until 2 items are corrected on the form. They are:  Page 2, Section IV-please check response for \"cardiac\", initial and date  Page 2 below Section V and above provider's signature, request response \"none\" for patient having any restrictions for activity or medications that may restrict her work. Initial and date response.  Patient is requesting corrected form be faxed to her attention at  #[phone], if possible today so she can be considered for new employment. Please follow up with patient to acknowledge corrections made and fax sent.   "

## 2025-07-19 DIAGNOSIS — F41.9 ANXIETY: ICD-10-CM

## 2025-07-21 RX ORDER — BUPROPION HYDROCHLORIDE 150 MG/1
150 TABLET ORAL DAILY
Qty: 90 TABLET | Refills: 0 | Status: SHIPPED | OUTPATIENT
Start: 2025-07-21

## 2025-07-21 RX ORDER — HYDROXYZINE HYDROCHLORIDE 25 MG/1
25 TABLET, FILM COATED ORAL EVERY 6 HOURS PRN
Qty: 90 TABLET | Refills: 0 | Status: SHIPPED | OUTPATIENT
Start: 2025-07-21

## 2025-07-21 NOTE — TELEPHONE ENCOUNTER
Patient called requesting refill for bupropion. Patient made aware medication was refilled on 7/21 for 90 with 0 refills to [facility] pharmacy. Patient instructed to contact the pharmacy and speak with someone directly to obtain refills of medication. Patient advised to call back for refill if their pharmacy is unable to assist them. Patient verbalized understanding.

## 2025-08-18 DIAGNOSIS — F41.9 ANXIETY: ICD-10-CM

## 2025-08-20 RX ORDER — HYDROXYZINE HYDROCHLORIDE 25 MG/1
25 TABLET, FILM COATED ORAL EVERY 6 HOURS PRN
Qty: 90 TABLET | Refills: 0 | Status: SHIPPED | OUTPATIENT
Start: 2025-08-20